# Patient Record
Sex: FEMALE | Race: WHITE | NOT HISPANIC OR LATINO | Employment: FULL TIME | URBAN - METROPOLITAN AREA
[De-identification: names, ages, dates, MRNs, and addresses within clinical notes are randomized per-mention and may not be internally consistent; named-entity substitution may affect disease eponyms.]

---

## 2023-04-27 ENCOUNTER — ULTRASOUND (OUTPATIENT)
Dept: OBGYN CLINIC | Facility: MEDICAL CENTER | Age: 38
End: 2023-04-27

## 2023-04-27 VITALS
HEIGHT: 67 IN | BODY MASS INDEX: 26.21 KG/M2 | SYSTOLIC BLOOD PRESSURE: 116 MMHG | WEIGHT: 167 LBS | DIASTOLIC BLOOD PRESSURE: 70 MMHG

## 2023-04-27 DIAGNOSIS — Z32.01 PREGNANCY CONFIRMED BY POSITIVE BLOOD TEST: Primary | ICD-10-CM

## 2023-05-12 ENCOUNTER — ULTRASOUND (OUTPATIENT)
Dept: OBGYN CLINIC | Facility: CLINIC | Age: 38
End: 2023-05-12

## 2023-05-12 VITALS
DIASTOLIC BLOOD PRESSURE: 76 MMHG | SYSTOLIC BLOOD PRESSURE: 120 MMHG | WEIGHT: 165 LBS | HEIGHT: 67 IN | BODY MASS INDEX: 25.9 KG/M2

## 2023-05-12 DIAGNOSIS — O26.841 UTERINE SIZE-DATE DISCREPANCY IN FIRST TRIMESTER: Primary | ICD-10-CM

## 2023-05-20 NOTE — PROGRESS NOTES
Assessment Khushi was seen today for pregnancy ultrasound  Diagnoses and all orders for this visit:    Uterine size-date discrepancy in first trimester  -     AMB US OB < 14 weeks single or first gestation level 1  -     Cancel: AMB US OB < 14 weeks single or first gestation level 1         Plan:  [address] today showing :   LIve IUP   FHT  174  EDC based on todays  US  23   Based on 6 weeks US  23    FINAL EDC 23    Recommend keeping scheduled appt for [facility] as interested in genetic testing as well as OB intake        Subjective   Herrera Garcia is a 45 y o  female here for a repeat dating US given discrepancy on dating derived from [address] and her LMP   LMP is  23 and she is sure of this as she has been attempting to conceive   Based on [address]  On 23 she was 6 weeks 5 days with ALEJANDRA  23   States she has been having nausea and feeling tired   No interested in prescriptions for management at this time  Also states has appt scheduled at [facility] as is interested in genetic testing   Her husbands sister has a child affected by rare mitochondrial disease     Patient Active Problem List   Diagnosis   • Abnormal uterine bleeding   • Anxiety   • Atypical bipolar affective disorder (HCC)   • BMI 27 0-27 9,adult   • Episodic mood disorder (HCC)   • Fatigue   • Inflammatory disease of cervix, vagina, and vulva   • Irregular menstrual cycle   • Menorrhagia   • Personality disorder [facility])       Gynecologic History  No LMP recorded  The current method of family planning is none      Past Medical History:   Diagnosis Date   • Anxiety    • Depression      Past Surgical History:   Procedure Laterality Date   •  SECTION       Family History   Problem Relation Age of Onset   • No Known Problems Mother    • No Known Problems Father    • No Known Problems Sister    • Bipolar disorder Brother    • No Known Problems Daughter      Social History     Socioeconomic History   • Marital status: /Civil Union Spouse name: Not on file   • Number of children: Not on file   • Years of education: Not on file   • Highest education level: Not on file   Occupational History   • Occupation: [employer]   Tobacco Use   • Smoking status: Never   • Smokeless tobacco: Never   Vaping Use   • Vaping Use: Never used   Substance and Sexual Activity   • Alcohol use: Not Currently     Comment: social   • Drug use: No   • Sexual activity: Yes     Partners: Male     Birth control/protection: None   Other Topics Concern   • Not on file   Social History Narrative    CONSUMES 2 CUPS OF COFFEE PER DY    Birth control not practiced    IUD in place     Social Determinants of Health     Financial Resource Strain: Not on file   Food Insecurity: Not on file   Transportation Needs: Not on file   Physical Activity: Not on file   Stress: Not on file   Social Connections: Not on file   Intimate Partner Violence: Not on file   Housing Stability: Not on file     No Known Allergies    Current Outpatient Medications:   •  escitalopram (LEXAPRO) 10 mg tablet, Take 10 mg by mouth, Disp: , Rfl:   •  ALPRAZolam (XANAX) 0 25 mg tablet, Take 0 25 mg by mouth every 24 hours (Patient not taking: Reported on 4/27/2023), Disp: , Rfl:   •  azelastine (ASTELIN) 0 1 % nasal spray, 1 spray into each nostril 2 (two) times a day, Disp: 1 Bottle, Rfl: 11  •  lidocaine (XYLOCAINE) 2 % topical gel, Apply 1 application topically 3 (three) times a day (Patient not taking: Reported on 8/23/2019), Disp: 5 mL, Rfl: 2    Review of Systems  Constitutional :no fever, feels well, no tiredness, no recent weight gain or loss  ENT: no ear ache, no loss of hearing, no nosebleeds or nasal discharge, no sore throat or hoarseness  Cardiovascular: no complaints of slow or fast heart beat, no chest pain, no palpitations, no leg claudication or lower extremity edema    Respiratory: no complaints of shortness of shortness of breath, no DAWSON  Breasts:no complaints of breast pain, breast lump, or "nipple discharge  Gastrointestinal: no complaints of abdominal pain, constipation, nausea, vomiting, or diarrhea or bloody stools  Genitourinary : no complaints of dysuria, incontinence, pelvic pain, no dysmenorrhea, vaginal discharge or abnormal vaginal bleeding and as noted in HPI  Musculoskeletal: no complaints of arthralgia, no myalgia, no joint swelling or stiffness, no limb pain or swelling  Integumentary: no complaints of skin rash or lesion, itching or dry skin  Neurological: no complaints of headache, no confusion, no numbness or tingling, no dizziness or fainting     Objective     /76   Ht 5' 7\" (1 702 m)   Wt 74 8 kg (165 lb)   BMI 25 84 kg/m²     General:   appears stated age, cooperative, alert normal mood and affect   Lungs: Unlabored breathing     Skin normal skin turgor and no rashes     Psychiatric orientation to person, place, and time: normal  mood and affect: normal     "

## 2023-05-23 ENCOUNTER — INITIAL PRENATAL (OUTPATIENT)
Dept: OBGYN CLINIC | Facility: MEDICAL CENTER | Age: 38
End: 2023-05-23

## 2023-05-23 VITALS
WEIGHT: 164 LBS | SYSTOLIC BLOOD PRESSURE: 92 MMHG | BODY MASS INDEX: 25.74 KG/M2 | HEIGHT: 67 IN | DIASTOLIC BLOOD PRESSURE: 68 MMHG

## 2023-05-23 DIAGNOSIS — Z34.81 PRENATAL CARE, SUBSEQUENT PREGNANCY, FIRST TRIMESTER: Primary | ICD-10-CM

## 2023-05-23 RX ORDER — AMOXICILLIN AND CLAVULANATE POTASSIUM 875; 125 MG/1; MG/1
TABLET, FILM COATED ORAL
COMMUNITY
Start: 2023-04-07

## 2023-05-23 RX ORDER — ALBUTEROL SULFATE 90 UG/1
2 AEROSOL, METERED RESPIRATORY (INHALATION) EVERY 6 HOURS PRN
COMMUNITY
Start: 2022-12-06

## 2023-05-23 RX ORDER — MELATONIN
2000 DAILY
COMMUNITY

## 2023-05-23 NOTE — PROGRESS NOTES
OB History    Para Term  AB Living   2 1 1     1   SAB IAB Ectopic Multiple Live Births           1      # Outcome Date GA Lbr Joe/2nd Weight Sex Delivery Anes PTL Lv   2 Current            1 Term  40w0d   F CS-LTranv   ZAIN      Complications: Failed Induction         * Pt presents for OB intake  *  *Pt's LMP was Patient's last menstrual period was 2023 (exact date)  *Ultrasound date:2023   10weeks 3days  *Estimated date of delivery: 2023   * confirmed by ultrasound    *Signs/Symptoms of Pregnancy   *yes Constipation    *no Headaches   *no Cramping  *no Spotting   *yes Nausea     Diabetes  If any of the following answers are  yes, please order 1 hour GTT, 50g   *no Hx of GDM    *no BMI >35    *no First degree relative with type 2 diabetes    *no Hx of PCOS   *no Current metformin use    *no Prior hx of LGA/macrosomia    *no AMA with other risk factors    Hypertension-    *no Hx of chronic HTN    *no Hx of gestational HTN   *no hx of preeclampsia, eclampsia, or HELLP syndrome     *Infection Screening   *no Does the pt have a hx of MRSA? *if yes- follow MRSA protocol and obtain a nasal swab for MRSA culture   *no History of herpes?      *Immunizations:   *yes Discussed influenza vaccine     Not flu season   *yes Discussed TDaP vaccine   *yes COVID Vaccine x2    SOCIOECONOMIC:  Mental/Physical/Sexual Abuse  *no  Housing Security  *no  Food Security  *no  Speacial Needs/Challenges  *no  Substance Use Disorder   ETOH   *no    OPIOD   *no     THC *no    Other: no  Opioid Therapy *no    ACTIVE MEDICAL/MENTAL HEALTH CONDITIONS  Depression *yes   Anxiety*yes  Medications*yes    *Interview education   *Handouts given:    *Baby and Me support center     *Isma sign up instructions    *Lab Locations    *  [place] Pediatricians List/Choosing Pediatrician Sheet    *[facility] Childbirth and Parenting Classes    *Schedule for Prenatal Visits    *Pregnancy Warning Signs Reviewed    *Safe Medications During Pregnancy    *SMA and CF Testing information sheet    *CPT Code Sheet/MFM 13week NT pamphlet    *[employer]      SBIRT Screen:  Depression Screening Follow-up Plan: Patient's depression screening was negative with an [place] score of 4          *[facility]   *yes discussed genetic testing- pt interested     Patient has been informed of basic prenatal advice such as avoiding alcohol, drugs, and smoking  She should remain hydrated and take daily prenatal vitamins  Patient should avoid caffeine, raw sprouts, high mercury fish, undercooked fish, raw eggs, organ meat, unwashed produce, and unpasteurized cheeses, milk, and fruit juice and undercooked meats  She has been informed about toxoplasmosis and to avoid cat feces  *Details that I feel the provider should be aware of:  Gwen Lozano presented today for initial ob intake at 10w3d  She has complaints of nausea and constipation - reviewed medications she can take  No other complaints at this time  MFM appointments made  Prenatal labs ordered  PN1 visit scheduled for *06/07/2023  The patient was oriented to our practice and all questions were answered      Interviewed by:  Pawel Dye

## 2023-06-06 PROBLEM — G44.209 TENSION TYPE HEADACHE: Status: ACTIVE | Noted: 2020-01-20

## 2023-06-06 NOTE — PROGRESS NOTES
Prenatal H&P     Denies loss of fluid, vaginal discharge, vaginal bleeding  and abdominal pain  Not feeling fetal movement  Tolerating PNV well  Has not gotten prenatal panel drawn yet  Plans for genetic screening appointment 23  Planned pregnancy    OB history:     G1- 2008 term  female 6lb 11oz; post term IOL failure to progress      G2- current      Dating:     LMP - 23 ALEJANDRA 12/3/23     US on 23 @  6w 5d ALEJANDRA 23  Working ALEJANDRA 23     Surgical history:      and wisdom teeth     Medical History:     Anxiety/ depression     Social history:     Alcohol/ tobacco/ illicit drug social alcohol use prior to pregnancy/denies drug or tobacco use     Denies history of STD/STI     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, family and friends supportive     PCP/ Dental last PE 2023 / last cleaning 3/2023     SBIRT screen negative at intake    She denies a hx of recent cough, chills, fever,  STD/STI, denies a personal history  of TB or  Zika virus or close contacts with persons with TB or COVID -19  FOB niece - mitochondrial disease  She denies any other family history of inheritable conditions such as physical or intellectual disabilities, birth defects, blood disorders, heart or neural tube defects  She has never had MRSA  She denies recent travel or travel planned in the near future  Patient Plans   - Feeding breast-feeding  - Contraception uncertain  - Aware office delivers at [employer]  If < 32 weeks will recommend evaluation at [address]:  - Continue prenatal vitamin daily  -  Genetic screening/ [facility] appointment 23  -Prior  section  Patient plans TOLAC  - History of depression/anxiety continue Lexapro as ordered 10 mg daily  Encouraged to call office with worsening symptoms, thoughts of self-harm or harm to others  -  Weight gain in pregnancy- Who BMI recommend 15-25 lb   Healthy diet and daily exercise reviewed and encouraged  - Unit regimen reviewed visit every 4 weeks until 28 weeks, every 2 weeks until 36 weeks and then weekly until delivery  - Gonorrhea/chlamydia collected  Pap smear collected  Encouraged to complete prenatal panel   - Vaccines in Pregnancy      - TDAP vaccine after 27 gestational weeks     - Reviewed with patient  Pregnancy with COVID infection is considered  high risk and can have poor outcome including  delivery, more severe infection  therefore  pregnant patients are recommended to get  COVID-19 vaccination  Written information provided  Had vaccine x  2     - influenza October- March NA   -  Common discomforts of pregnancy and precautions reviewed  Signs and symptoms to report reviewed  Encouraged social distancing, good hand hygiene, masking, avoiding crowds and written information provided about COVID-19    RTO 4 weeks

## 2023-06-07 ENCOUNTER — APPOINTMENT (OUTPATIENT)
Dept: LAB | Facility: MEDICAL CENTER | Age: 38
End: 2023-06-07
Payer: COMMERCIAL

## 2023-06-07 ENCOUNTER — INITIAL PRENATAL (OUTPATIENT)
Dept: OBGYN CLINIC | Facility: MEDICAL CENTER | Age: 38
End: 2023-06-07

## 2023-06-07 VITALS — DIASTOLIC BLOOD PRESSURE: 62 MMHG | WEIGHT: 166 LBS | BODY MASS INDEX: 26 KG/M2 | SYSTOLIC BLOOD PRESSURE: 110 MMHG

## 2023-06-07 DIAGNOSIS — Z12.4 SCREENING FOR CERVICAL CANCER: ICD-10-CM

## 2023-06-07 DIAGNOSIS — Z3A.12 12 WEEKS GESTATION OF PREGNANCY: ICD-10-CM

## 2023-06-07 DIAGNOSIS — Z34.81 PRENATAL CARE, SUBSEQUENT PREGNANCY, FIRST TRIMESTER: ICD-10-CM

## 2023-06-07 DIAGNOSIS — O09.522 MULTIGRAVIDA OF ADVANCED MATERNAL AGE IN SECOND TRIMESTER: Primary | ICD-10-CM

## 2023-06-07 DIAGNOSIS — Z11.3 ROUTINE SCREENING FOR STI (SEXUALLY TRANSMITTED INFECTION): ICD-10-CM

## 2023-06-07 LAB
ABO GROUP BLD: NORMAL
BACTERIA UR QL AUTO: ABNORMAL /HPF
BASOPHILS # BLD AUTO: 0.03 THOUSANDS/ÂΜL (ref 0–0.1)
BASOPHILS NFR BLD AUTO: 0 % (ref 0–1)
BILIRUB UR QL STRIP: NEGATIVE
BLD GP AB SCN SERPL QL: NEGATIVE
CAOX CRY URNS QL MICRO: ABNORMAL /HPF
CLARITY UR: CLEAR
COLOR UR: YELLOW
EOSINOPHIL # BLD AUTO: 0.22 THOUSAND/ÂΜL (ref 0–0.61)
EOSINOPHIL NFR BLD AUTO: 2 % (ref 0–6)
ERYTHROCYTE [DISTWIDTH] IN BLOOD BY AUTOMATED COUNT: 12.9 % (ref 11.6–15.1)
GLUCOSE UR STRIP-MCNC: NEGATIVE MG/DL
HCT VFR BLD AUTO: 38.1 % (ref 34.8–46.1)
HGB BLD-MCNC: 12.5 G/DL (ref 11.5–15.4)
HGB UR QL STRIP.AUTO: NEGATIVE
HIV 1+2 AB+HIV1 P24 AG SERPL QL IA: NORMAL
HIV 2 AB SERPL QL IA: NORMAL
HIV1 AB SERPL QL IA: NORMAL
HIV1 P24 AG SERPL QL IA: NORMAL
IMM GRANULOCYTES # BLD AUTO: 0.06 THOUSAND/UL (ref 0–0.2)
IMM GRANULOCYTES NFR BLD AUTO: 1 % (ref 0–2)
KETONES UR STRIP-MCNC: NEGATIVE MG/DL
LEUKOCYTE ESTERASE UR QL STRIP: NEGATIVE
LYMPHOCYTES # BLD AUTO: 2.32 THOUSANDS/ÂΜL (ref 0.6–4.47)
LYMPHOCYTES NFR BLD AUTO: 21 % (ref 14–44)
MCH RBC QN AUTO: 29.1 PG (ref 26.8–34.3)
MCHC RBC AUTO-ENTMCNC: 32.8 G/DL (ref 31.4–37.4)
MCV RBC AUTO: 89 FL (ref 82–98)
MONOCYTES # BLD AUTO: 0.69 THOUSAND/ÂΜL (ref 0.17–1.22)
MONOCYTES NFR BLD AUTO: 6 % (ref 4–12)
MUCOUS THREADS UR QL AUTO: ABNORMAL
NEUTROPHILS # BLD AUTO: 7.78 THOUSANDS/ÂΜL (ref 1.85–7.62)
NEUTS SEG NFR BLD AUTO: 70 % (ref 43–75)
NITRITE UR QL STRIP: NEGATIVE
NON-SQ EPI CELLS URNS QL MICRO: ABNORMAL /HPF
NRBC BLD AUTO-RTO: 0 /100 WBCS
PH UR STRIP.AUTO: 6 [PH]
PLATELET # BLD AUTO: 301 THOUSANDS/UL (ref 149–390)
PMV BLD AUTO: 11.3 FL (ref 8.9–12.7)
PROT UR STRIP-MCNC: ABNORMAL MG/DL
RBC # BLD AUTO: 4.3 MILLION/UL (ref 3.81–5.12)
RBC #/AREA URNS AUTO: ABNORMAL /HPF
RH BLD: POSITIVE
RUBV IGG SERPL IA-ACNC: 40.2 IU/ML
SP GR UR STRIP.AUTO: 1.02 (ref 1–1.03)
SPECIMEN EXPIRATION DATE: NORMAL
TREPONEMA PALLIDUM IGG+IGM AB [PRESENCE] IN SERUM OR PLASMA BY IMMUNOASSAY: NORMAL
UROBILINOGEN UR STRIP-ACNC: <2 MG/DL
WBC # BLD AUTO: 11.1 THOUSAND/UL (ref 4.31–10.16)
WBC #/AREA URNS AUTO: ABNORMAL /HPF

## 2023-06-07 PROCEDURE — 87591 N.GONORRHOEAE DNA AMP PROB: CPT | Performed by: NURSE PRACTITIONER

## 2023-06-07 PROCEDURE — 86850 RBC ANTIBODY SCREEN: CPT

## 2023-06-07 PROCEDURE — G0476 HPV COMBO ASSAY CA SCREEN: HCPCS | Performed by: NURSE PRACTITIONER

## 2023-06-07 PROCEDURE — 87389 HIV-1 AG W/HIV-1&-2 AB AG IA: CPT

## 2023-06-07 PROCEDURE — 86780 TREPONEMA PALLIDUM: CPT

## 2023-06-07 PROCEDURE — 86901 BLOOD TYPING SEROLOGIC RH(D): CPT

## 2023-06-07 PROCEDURE — 87491 CHLMYD TRACH DNA AMP PROBE: CPT | Performed by: NURSE PRACTITIONER

## 2023-06-07 PROCEDURE — 86706 HEP B SURFACE ANTIBODY: CPT

## 2023-06-07 PROCEDURE — 86762 RUBELLA ANTIBODY: CPT

## 2023-06-07 PROCEDURE — 87086 URINE CULTURE/COLONY COUNT: CPT

## 2023-06-07 PROCEDURE — 85025 COMPLETE CBC W/AUTO DIFF WBC: CPT

## 2023-06-07 PROCEDURE — G0145 SCR C/V CYTO,THINLAYER,RESCR: HCPCS | Performed by: NURSE PRACTITIONER

## 2023-06-07 PROCEDURE — PNV: Performed by: NURSE PRACTITIONER

## 2023-06-07 PROCEDURE — 87340 HEPATITIS B SURFACE AG IA: CPT

## 2023-06-07 PROCEDURE — 86900 BLOOD TYPING SEROLOGIC ABO: CPT

## 2023-06-07 PROCEDURE — 36415 COLL VENOUS BLD VENIPUNCTURE: CPT

## 2023-06-08 ENCOUNTER — ROUTINE PRENATAL (OUTPATIENT)
Age: 38
End: 2023-06-08
Payer: COMMERCIAL

## 2023-06-08 VITALS
HEIGHT: 67 IN | DIASTOLIC BLOOD PRESSURE: 58 MMHG | SYSTOLIC BLOOD PRESSURE: 102 MMHG | HEART RATE: 84 BPM | WEIGHT: 166.6 LBS | BODY MASS INDEX: 26.15 KG/M2

## 2023-06-08 DIAGNOSIS — Z36.82 ENCOUNTER FOR (NT) NUCHAL TRANSLUCENCY SCAN: Primary | ICD-10-CM

## 2023-06-08 DIAGNOSIS — Z3A.12 12 WEEKS GESTATION OF PREGNANCY: ICD-10-CM

## 2023-06-08 DIAGNOSIS — O09.521 MULTIGRAVIDA OF ADVANCED MATERNAL AGE IN FIRST TRIMESTER: ICD-10-CM

## 2023-06-08 DIAGNOSIS — O34.219 HISTORY OF CESAREAN DELIVERY, ANTEPARTUM: ICD-10-CM

## 2023-06-08 DIAGNOSIS — Z32.01 PREGNANCY CONFIRMED BY POSITIVE BLOOD TEST: ICD-10-CM

## 2023-06-08 LAB
BACTERIA UR CULT: NORMAL
C TRACH DNA SPEC QL NAA+PROBE: NEGATIVE
HBV SURFACE AB SER-ACNC: <3 MIU/ML
HBV SURFACE AG SER QL: NORMAL
N GONORRHOEA DNA SPEC QL NAA+PROBE: NEGATIVE

## 2023-06-08 PROCEDURE — 99203 OFFICE O/P NEW LOW 30 MIN: CPT | Performed by: STUDENT IN AN ORGANIZED HEALTH CARE EDUCATION/TRAINING PROGRAM

## 2023-06-08 PROCEDURE — 36415 COLL VENOUS BLD VENIPUNCTURE: CPT | Performed by: STUDENT IN AN ORGANIZED HEALTH CARE EDUCATION/TRAINING PROGRAM

## 2023-06-08 PROCEDURE — 76801 OB US < 14 WKS SINGLE FETUS: CPT | Performed by: STUDENT IN AN ORGANIZED HEALTH CARE EDUCATION/TRAINING PROGRAM

## 2023-06-08 PROCEDURE — 76813 OB US NUCHAL MEAS 1 GEST: CPT | Performed by: STUDENT IN AN ORGANIZED HEALTH CARE EDUCATION/TRAINING PROGRAM

## 2023-06-08 NOTE — PROGRESS NOTES
"[facility]: Ms Carmen De Anda was seen today for nuchal translucency ultrasound  See ultrasound report under \"OB Procedures\" tab  Physical Exam  Constitutional:       General: She is not in acute distress  Appearance: Normal appearance  HENT:      Head: Normocephalic and atraumatic  Eyes:      Extraocular Movements: Extraocular movements intact  Cardiovascular:      Rate and Rhythm: Normal rate  Pulmonary:      Effort: Pulmonary effort is normal  No respiratory distress  Skin:     Findings: No erythema or rash  Neurological:      Mental Status: She is alert and oriented to person, place, and time  Psychiatric:         Mood and Affect: Mood normal          Behavior: Behavior normal          Please don't hesitate to contact our office with any concerns or questions    -Bishop Mely MD      "

## 2023-06-08 NOTE — PROGRESS NOTES
"Patient chose to have [facility] Non-invasive Prenatal Screen with fetal sex (per pt request)  Patient given brochure and is aware [facility] will contact their insurance and coordinate coverage  Patient made aware she will need to respond to text message or e-mail from Domonique Chan within 2 business days or testing will be run through insurance  Patient informed text message will come from area code  \"415\"  Provided [employer] # [phone] and web site : [email or web address]  \"[place] your test online\" card with barcode and test tube ID provided to patient  Reviewed [facility]'s web site states 5-7 business days for results via their portal    [employer] message will be sent to patient when MFM receives results /provider reviews  2 vials of blood drawn from right arm by Collin Bradford RN  Patient tolerated blood draw without difficulty  Specimens labeled with patient identifiers (name, date of birth, specimen collection date), order and specimen were verified with patient, packed and sent via [employer]  FED EX  tracking #  [id number]  Copy of lab order scanned to Epic media  Maternal Fetal Medicine will have results in approximately 7-10 business days and will call patient or notify via [address]  Patient aware viewing lab result online will reveal fetal sex if ordered  Patient verbalized understanding of all instructions and no questions at this time      "

## 2023-06-09 LAB
HPV HR 12 DNA CVX QL NAA+PROBE: NEGATIVE
HPV16 DNA CVX QL NAA+PROBE: NEGATIVE
HPV18 DNA CVX QL NAA+PROBE: NEGATIVE

## 2023-06-13 LAB
LAB AP GYN PRIMARY INTERPRETATION: NORMAL
Lab: NORMAL

## 2023-07-05 ENCOUNTER — ROUTINE PRENATAL (OUTPATIENT)
Dept: OBGYN CLINIC | Facility: MEDICAL CENTER | Age: 38
End: 2023-07-05

## 2023-07-05 ENCOUNTER — APPOINTMENT (OUTPATIENT)
Dept: LAB | Facility: MEDICAL CENTER | Age: 38
End: 2023-07-05
Payer: COMMERCIAL

## 2023-07-05 VITALS — WEIGHT: 169 LBS | SYSTOLIC BLOOD PRESSURE: 114 MMHG | BODY MASS INDEX: 26.47 KG/M2 | DIASTOLIC BLOOD PRESSURE: 66 MMHG

## 2023-07-05 DIAGNOSIS — Z3A.16 16 WEEKS GESTATION OF PREGNANCY: Primary | ICD-10-CM

## 2023-07-05 DIAGNOSIS — Z3A.16 16 WEEKS GESTATION OF PREGNANCY: ICD-10-CM

## 2023-07-05 PROCEDURE — 82105 ALPHA-FETOPROTEIN SERUM: CPT

## 2023-07-05 PROCEDURE — PNV: Performed by: OBSTETRICS & GYNECOLOGY

## 2023-07-05 PROCEDURE — 36415 COLL VENOUS BLD VENIPUNCTURE: CPT

## 2023-07-05 NOTE — PROGRESS NOTES
Palma Patel is a 45y.o. year old  at 16w4d for routine prenatal visit.   + FM, no vaginal bleeding, contractions, or LOF  Complaints: Yes - dry stuffy nose  - supportive care reviewed    Most recent ultrasound and labs reviewed.   Order for MSAFP given   Reviewed her prenatal labs - aware Hep B non immune and that this vaccine can be given in pregnancy  Through pcp   Has level 2 scheduled

## 2023-07-08 LAB
2ND TRIMESTER 4 SCREEN SERPL-IMP: NORMAL
AFP ADJ MOM SERPL: 0.73
AFP INTERP AMN-IMP: NORMAL
AFP INTERP SERPL-IMP: NORMAL
AFP INTERP SERPL-IMP: NORMAL
AFP SERPL-MCNC: 30.6 NG/ML
AGE AT DELIVERY: 38.6 YR
GA METHOD: NORMAL
GA: 18.1 WEEKS
IDDM PATIENT QL: NO
MULTIPLE PREGNANCY: NO
NEURAL TUBE DEFECT RISK FETUS: NORMAL %

## 2023-07-19 ENCOUNTER — TELEPHONE (OUTPATIENT)
Facility: HOSPITAL | Age: 38
End: 2023-07-19

## 2023-07-19 NOTE — TELEPHONE ENCOUNTER
Left voicemail informing patient we unfortunately had to reschedule her 8/2 appointment to 8/8 at 8am in [facility]. Requested she give our office a call back at [phone] with any questions or if she would need to reschedule.

## 2023-08-01 PROBLEM — Z3A.20 20 WEEKS GESTATION OF PREGNANCY: Status: ACTIVE | Noted: 2023-06-07

## 2023-08-02 ENCOUNTER — ROUTINE PRENATAL (OUTPATIENT)
Dept: OBGYN CLINIC | Facility: MEDICAL CENTER | Age: 38
End: 2023-08-02

## 2023-08-02 VITALS
BODY MASS INDEX: 27.56 KG/M2 | SYSTOLIC BLOOD PRESSURE: 102 MMHG | DIASTOLIC BLOOD PRESSURE: 60 MMHG | HEIGHT: 67 IN | WEIGHT: 175.6 LBS

## 2023-08-02 DIAGNOSIS — Z3A.20 20 WEEKS GESTATION OF PREGNANCY: ICD-10-CM

## 2023-08-02 DIAGNOSIS — O09.522 MULTIGRAVIDA OF ADVANCED MATERNAL AGE IN SECOND TRIMESTER: Primary | ICD-10-CM

## 2023-08-02 PROCEDURE — PNV: Performed by: NURSE PRACTITIONER

## 2023-08-02 RX ORDER — ESCITALOPRAM OXALATE 10 MG/1
10 TABLET ORAL DAILY
COMMUNITY
Start: 2023-07-05

## 2023-08-02 NOTE — PROGRESS NOTES
Denies loss of fluid, vaginal bleeding and abdominal pain. Confirms fetal movement. Tolerating prenatal vitamin, low-dose aspirin probiotic, vitamin D and Lexapro well. Denies worsening symptoms, thoughts of self-harm or harm to others. Has not needed Xanax. Had a headache last week that lasted for about 3 days bilateral temples and back of head. Associated symptoms included nasal congestion. Headache was relieved with Tylenol and Claritin use. Has a history of allergies. Denies other concerns or questions at today's visit  BP: 102/60 weight: +12lbs  Plan   -continue prenatal vitamins and low-dose aspirin daily  -Anxiety in pregnancy continue Lexapro as ordered. Encouraged to call office with worsening symptoms, thoughts of harm to others or self-harm  -Follow-up with  center scheduled for 23  -Reviewed with patient options for antihistamine-Claritin, Allegra or Zyrtec without D. Can also take plain Sudafed. -Common discomforts of pregnancy and precautions reviewed.   Signs and symptoms to report reviewed  RTO 4 weeks f/u US

## 2023-08-08 ENCOUNTER — ROUTINE PRENATAL (OUTPATIENT)
Age: 38
End: 2023-08-08
Payer: COMMERCIAL

## 2023-08-08 VITALS
SYSTOLIC BLOOD PRESSURE: 100 MMHG | DIASTOLIC BLOOD PRESSURE: 70 MMHG | HEART RATE: 97 BPM | HEIGHT: 67 IN | BODY MASS INDEX: 27.88 KG/M2 | WEIGHT: 177.6 LBS

## 2023-08-08 DIAGNOSIS — Z36.86 ENCOUNTER FOR ANTENATAL SCREENING FOR CERVICAL LENGTH: ICD-10-CM

## 2023-08-08 DIAGNOSIS — Z3A.21 21 WEEKS GESTATION OF PREGNANCY: ICD-10-CM

## 2023-08-08 DIAGNOSIS — O35.EXX0 PYELECTASIS OF FETUS ON PRENATAL ULTRASOUND: Primary | ICD-10-CM

## 2023-08-08 PROCEDURE — 76817 TRANSVAGINAL US OBSTETRIC: CPT | Performed by: OBSTETRICS & GYNECOLOGY

## 2023-08-08 PROCEDURE — 76811 OB US DETAILED SNGL FETUS: CPT | Performed by: OBSTETRICS & GYNECOLOGY

## 2023-08-08 PROCEDURE — 99213 OFFICE O/P EST LOW 20 MIN: CPT | Performed by: OBSTETRICS & GYNECOLOGY

## 2023-08-08 NOTE — LETTER
2023     [address] [facility] [address], [address] [facility]  [place]    Patient: Leonie Sow   YOB: 1985   Date of Visit: 2023       Dear Dr. Hussein Nieves: Thank you for referring Leonie Sow to me for evaluation. Below are my notes for this consultation. If you have questions, please do not hesitate to call me. I look forward to following your patient along with you. Sincerely,        Mo Barajas MD        CC: No Recipients    oM Barajas MD  2023  1:29 PM  Sign when Signing Visit  Leonie Sow  has no complaints today at 21w3d. She reports fetal movements and does not report any vaginal bleeding or signs of labor. Her recently completed fetal testing revealed a normal NIPT and MSAFP. She is here today for a and anatomical survey of her fetus. Problem list:  1. Long interval pregnancy  2. Advanced maternal age  1. History of prior  is planning a   4. Maternal anxiety on Lexapro    Ultrasound findings: The ultrasound today shows normal interval fetal growth and fluid, normal cervical length, and no malformations were detected. The renal pelvises appear prominent and measure 4 mm bilaterally. Pregnancy ultrasound has limitations and is unable to detect all forms of fetal congenital abnormalities. Specific counseling was provided on the following problems:  1. 2 - 3% of babies have pyelectasis ( dilation of the renal pelvis) on US which has resulted from a blockage along the urinary tract. Renal pelvis size less then 4 mm in the second trimester and less then 7 mm after 32 weeks is a normal variant as long as there is no calyceal dilation, ureteromegaly, oligohydramnios, and the bladder and renal parenchyma appear normal. It is found more commonly in males. Follow up recommended:   1. Recommend a follow-up ultrasound for fetal growth and review of the fetal kidneys at 32 weeks.      Pre visit time reviewing her records   5 minutes  Face to face time 10 minutes  Post visit time on documentation of note, updating her problem list, adding orders and prescriptions 5 minutes. Procedures that were completed today were charged separately. The level of decision making was low level complexity.     Shlomo Lyles MD

## 2023-08-08 NOTE — PROGRESS NOTES
Ultrasound Probe Disinfection    A transvaginal ultrasound was performed. Prior to use, disinfection was performed with High Level Disinfection Process ([employer]). Probe serial number S2: [id number] was used.       Ariella Tran  08/08/23  7:55 AM

## 2023-08-08 NOTE — PROGRESS NOTES
Catrina Allred  has no complaints today at 21w3d. She reports fetal movements and does not report any vaginal bleeding or signs of labor. Her recently completed fetal testing revealed a normal NIPT and MSAFP. She is here today for a and anatomical survey of her fetus. Problem list:  1. Long interval pregnancy  2. Advanced maternal age  1. History of prior  is planning a   4. Maternal anxiety on Lexapro    Ultrasound findings: The ultrasound today shows normal interval fetal growth and fluid, normal cervical length, and no malformations were detected. The renal pelvises appear prominent and measure 4 mm bilaterally. Pregnancy ultrasound has limitations and is unable to detect all forms of fetal congenital abnormalities. Specific counseling was provided on the following problems:  1. 2 - 3% of babies have pyelectasis ( dilation of the renal pelvis) on US which has resulted from a blockage along the urinary tract. Renal pelvis size less then 4 mm in the second trimester and less then 7 mm after 32 weeks is a normal variant as long as there is no calyceal dilation, ureteromegaly, oligohydramnios, and the bladder and renal parenchyma appear normal. It is found more commonly in males. Follow up recommended:   1. Recommend a follow-up ultrasound for fetal growth and review of the fetal kidneys at 32 weeks. Pre visit time reviewing her records   5 minutes  Face to face time 10 minutes  Post visit time on documentation of note, updating her problem list, adding orders and prescriptions 5 minutes. Procedures that were completed today were charged separately. The level of decision making was low level complexity.     Moise Robledo MD

## 2023-08-29 ENCOUNTER — ROUTINE PRENATAL (OUTPATIENT)
Dept: OBGYN CLINIC | Facility: MEDICAL CENTER | Age: 38
End: 2023-08-29

## 2023-08-29 VITALS — WEIGHT: 181.9 LBS | DIASTOLIC BLOOD PRESSURE: 65 MMHG | BODY MASS INDEX: 28.49 KG/M2 | SYSTOLIC BLOOD PRESSURE: 110 MMHG

## 2023-08-29 DIAGNOSIS — O09.522 MULTIGRAVIDA OF ADVANCED MATERNAL AGE IN SECOND TRIMESTER: ICD-10-CM

## 2023-08-29 DIAGNOSIS — Z3A.28 28 WEEKS GESTATION OF PREGNANCY: ICD-10-CM

## 2023-08-29 DIAGNOSIS — Z34.82 ENCOUNTER FOR SUPERVISION OF OTHER NORMAL PREGNANCY IN SECOND TRIMESTER: Primary | ICD-10-CM

## 2023-08-29 PROCEDURE — PNV: Performed by: OBSTETRICS & GYNECOLOGY

## 2023-08-29 NOTE — PROGRESS NOTES
Routine Prenatal Visit  [facility] [address] [place] [address]    Assessment/Plan:  Marleni Bailey is a 45y.o. year old  at 20w1d who presents for routine prenatal visit. 1. Encounter for supervision of other normal pregnancy in second trimester    2. 28 weeks gestation of pregnancy  -     Anemia Panel w/Reflex, OB; Future  -     CBC and differential; Future  -     Glucose, 1H PG; Future  -     RPR-Syphilis Screening (Total Syphilis IGG/IGM); Future  -     Hepatitis C antibody; Future    3. Multigravida of advanced maternal age in second trimester          Subjective:     CC: Prenatal care    Ruddy Ohara is a 45 y.o.  female who presents for routine prenatal care at 20w1d. Pregnancy ROS: no leakage of fluid, pelvic pain, or vaginal bleeding.  + fetal movement. interested in tolac    The following portions of the patient's history were reviewed and updated as appropriate: allergies, current medications, past family history, past medical history, obstetric history, gynecologic history, past social history, past surgical history and problem list.      Objective:  /65   Wt 82.5 kg (181 lb 14.4 oz)   LMP 2023 (Exact Date)   BMI 28.49 kg/m²   Pregravid Weight/BMI: 73.9 kg (163 lb) (BMI 25.52)  Current Weight: 82.5 kg (181 lb 14.4 oz)   Total Weight Gain: 8.573 kg (18 lb 14.4 oz)   Pre- Vitals    Flowsheet Row Most Recent Value   Prenatal Assessment    Fetal Heart Rate 152   Movement Present   Prenatal Vitals    Blood Pressure 110/65   Weight - Scale 82.5 kg (181 lb 14.4 oz)   Urine Albumin/Glucose    Dilation/Effacement/Station    Vaginal Drainage    Edema    LLE Edema None   RLE Edema None   Facial Edema None           General: Well appearing, no distress  Respiratory: Unlabored breathing  Cardiovascular: Regular rate. Abdomen: Soft, gravid, nontender  Fundal Height: Appropriate for gestational age. Extremities: Warm and well perfused.   Non tender.

## 2023-09-26 ENCOUNTER — ROUTINE PRENATAL (OUTPATIENT)
Dept: OBGYN CLINIC | Facility: MEDICAL CENTER | Age: 38
End: 2023-09-26
Payer: COMMERCIAL

## 2023-09-26 ENCOUNTER — LAB (OUTPATIENT)
Dept: LAB | Facility: MEDICAL CENTER | Age: 38
End: 2023-09-26
Payer: COMMERCIAL

## 2023-09-26 VITALS — WEIGHT: 192.1 LBS | DIASTOLIC BLOOD PRESSURE: 60 MMHG | SYSTOLIC BLOOD PRESSURE: 102 MMHG | BODY MASS INDEX: 30.09 KG/M2

## 2023-09-26 DIAGNOSIS — Z23 NEED FOR TDAP VACCINATION: ICD-10-CM

## 2023-09-26 DIAGNOSIS — F41.9 ANXIETY: ICD-10-CM

## 2023-09-26 DIAGNOSIS — Z3A.28 28 WEEKS GESTATION OF PREGNANCY: ICD-10-CM

## 2023-09-26 DIAGNOSIS — O09.522 MULTIGRAVIDA OF ADVANCED MATERNAL AGE IN SECOND TRIMESTER: ICD-10-CM

## 2023-09-26 DIAGNOSIS — O34.219 PREVIOUS CESAREAN DELIVERY AFFECTING PREGNANCY: ICD-10-CM

## 2023-09-26 DIAGNOSIS — O99.891 BACK PAIN AFFECTING PREGNANCY IN THIRD TRIMESTER: ICD-10-CM

## 2023-09-26 DIAGNOSIS — M54.9 BACK PAIN AFFECTING PREGNANCY IN THIRD TRIMESTER: ICD-10-CM

## 2023-09-26 DIAGNOSIS — Z3A.28 28 WEEKS GESTATION OF PREGNANCY: Primary | ICD-10-CM

## 2023-09-26 LAB
BASOPHILS # BLD MANUAL: 0 THOUSAND/UL (ref 0–0.1)
BASOPHILS NFR MAR MANUAL: 0 % (ref 0–1)
EOSINOPHIL # BLD MANUAL: 0 THOUSAND/UL (ref 0–0.4)
EOSINOPHIL NFR BLD MANUAL: 0 % (ref 0–6)
ERYTHROCYTE [DISTWIDTH] IN BLOOD BY AUTOMATED COUNT: 13.6 % (ref 11.6–15.1)
GLUCOSE 1H P 50 G GLC PO SERPL-MCNC: 118 MG/DL (ref 40–134)
HCT VFR BLD AUTO: 34.1 % (ref 34.8–46.1)
HCV AB SER QL: NORMAL
HGB BLD-MCNC: 11.1 G/DL (ref 11.5–15.4)
LYMPHOCYTES # BLD AUTO: 2.76 THOUSAND/UL (ref 0.6–4.47)
LYMPHOCYTES # BLD AUTO: 8 % (ref 14–44)
MCH RBC QN AUTO: 30.4 PG (ref 26.8–34.3)
MCHC RBC AUTO-ENTMCNC: 32.6 G/DL (ref 31.4–37.4)
MCV RBC AUTO: 93 FL (ref 82–98)
MONOCYTES # BLD AUTO: 0.61 THOUSAND/UL (ref 0–1.22)
MONOCYTES NFR BLD: 4 % (ref 4–12)
MYELOCYTES NFR BLD MANUAL: 2 % (ref 0–1)
NEUTROPHILS # BLD MANUAL: 11.35 THOUSAND/UL (ref 1.85–7.62)
NEUTS BAND NFR BLD MANUAL: 3 % (ref 0–8)
NEUTS SEG NFR BLD AUTO: 71 % (ref 43–75)
PLATELET # BLD AUTO: 247 THOUSANDS/UL (ref 149–390)
PLATELET BLD QL SMEAR: ADEQUATE
PMV BLD AUTO: 11.6 FL (ref 8.9–12.7)
POLYCHROMASIA BLD QL SMEAR: PRESENT
PROMYELOCYTES NFR BLD MANUAL: 2 % (ref 0–0)
RBC # BLD AUTO: 3.65 MILLION/UL (ref 3.81–5.12)
RBC MORPH BLD: PRESENT
TREPONEMA PALLIDUM IGG+IGM AB [PRESENCE] IN SERUM OR PLASMA BY IMMUNOASSAY: NORMAL
VARIANT LYMPHS # BLD AUTO: 10 %
WBC # BLD AUTO: 15.34 THOUSAND/UL (ref 4.31–10.16)

## 2023-09-26 PROCEDURE — 90471 IMMUNIZATION ADMIN: CPT | Performed by: OBSTETRICS & GYNECOLOGY

## 2023-09-26 PROCEDURE — 86780 TREPONEMA PALLIDUM: CPT

## 2023-09-26 PROCEDURE — 82950 GLUCOSE TEST: CPT

## 2023-09-26 PROCEDURE — 36415 COLL VENOUS BLD VENIPUNCTURE: CPT

## 2023-09-26 PROCEDURE — 86803 HEPATITIS C AB TEST: CPT

## 2023-09-26 PROCEDURE — 90715 TDAP VACCINE 7 YRS/> IM: CPT | Performed by: OBSTETRICS & GYNECOLOGY

## 2023-09-26 PROCEDURE — 85027 COMPLETE CBC AUTOMATED: CPT

## 2023-09-26 PROCEDURE — PNV: Performed by: OBSTETRICS & GYNECOLOGY

## 2023-09-26 PROCEDURE — 85007 BL SMEAR W/DIFF WBC COUNT: CPT

## 2023-09-26 NOTE — PATIENT INSTRUCTIONS
Kick Counts in Pregnancy   WHAT YOU NEED TO KNOW:   Kick counts measure how much your baby is moving in your womb. A kick from your baby can be felt as a twist, turn, swish, roll, or jab. It is common to feel your baby kicking at 26 to 28 weeks of pregnancy. You may feel your baby kick as early as 20 weeks of pregnancy. You may want to start counting at 28 weeks. DISCHARGE INSTRUCTIONS:   Contact your doctor immediately if:   You feel a change in the number of kicks or movements of your baby. You feel fewer than 10 kicks within 2 hours. You have questions or concerns about your baby's movements. Why measure kick counts:  Your baby's movement may provide information about your baby's health. He or she may move less, or not at all, if there are problems. Your baby may move less if he or she is not getting enough oxygen or nutrition from the placenta. Do not smoke while you are pregnant. Smoking decreases the amount of oxygen that gets to your baby. Talk to your healthcare provider if you need help to quit smoking. Tell your healthcare provider as soon as you feel a change in your baby's movements. When to measure kick counts:   Measure kick counts at the same time every day. Measure kick counts when your baby is awake and most active. Your baby may be most active in the evening. How to measure kick counts:  Check that your baby is awake before you measure kick counts. You can wake up your baby by lightly pushing on your belly, walking, or drinking something cold. Your healthcare provider may tell you different ways to measure kick counts. You may be told to do the following:  Use a chart or clock to keep track of the time you start and finish counting. Sit in a chair or lie on your left side. Place your hands on the largest part of your belly. Count until you reach 10 kicks. Write down how much time it takes to count 10 kicks. It may take 30 minutes to 2 hours to count 10 kicks. It should not take more than 2 hours to count 10 kicks. Follow up with your doctor as directed:  Write down your questions so you remember to ask them during your visits. © Copyright Laura Sanchez 2023 Information is for End User's use only and may not be sold, redistributed or otherwise used for commercial purposes. The above information is an  only. It is not intended as medical advice for individual conditions or treatments. Talk to your doctor, nurse or pharmacist before following any medical regimen to see if it is safe and effective for you.

## 2023-09-26 NOTE — PROGRESS NOTES
Romy Worrell is a 45y.o. year old  at 28w3d for routine prenatal visit.   + FM, no vaginal bleeding, contractions, or LOF  Complaints: Yes - back pain at specific spot , we discussed stretching , tense unit , PT   Will go for 28 week labs today   [facility] reviewed   28 week packet discussed    TDAP given today   Rhogam not indicated   Most recent ultrasound and labs reviewed.   Previous C/S 15 years ago  - interested in [facility] / risks and benefits reviewed   Anxiety - doing well in lexapro

## 2023-09-29 NOTE — RESULT ENCOUNTER NOTE
Please inform patient normal 1 hr gtt - some abnormalities on CBC likely related to pregnancy  - recommend repeating CBC for next visit

## 2023-10-02 ENCOUNTER — CLINICAL SUPPORT (OUTPATIENT)
Dept: POSTPARTUM | Facility: CLINIC | Age: 38
End: 2023-10-02

## 2023-10-02 DIAGNOSIS — Z32.2 ENCOUNTER FOR CHILDBIRTH INSTRUCTION: Primary | ICD-10-CM

## 2023-10-10 ENCOUNTER — ROUTINE PRENATAL (OUTPATIENT)
Dept: OBGYN CLINIC | Facility: MEDICAL CENTER | Age: 38
End: 2023-10-10

## 2023-10-10 VITALS — DIASTOLIC BLOOD PRESSURE: 72 MMHG | SYSTOLIC BLOOD PRESSURE: 108 MMHG | BODY MASS INDEX: 30.62 KG/M2 | WEIGHT: 195.5 LBS

## 2023-10-10 DIAGNOSIS — Z3A.30 30 WEEKS GESTATION OF PREGNANCY: Primary | ICD-10-CM

## 2023-10-10 PROCEDURE — PNV: Performed by: OBSTETRICS & GYNECOLOGY

## 2023-10-10 NOTE — PROGRESS NOTES
Fabian Toro is a 45y.o. year old  at 30w3d for routine prenatal visit.   + FM, no vaginal bleeding, contractions, or LOF  Complaints: No   Most recent ultrasound and labs reviewed.   Has follow up scan end    [facility] reviewed   States will get flu shot next visit   Birth plan reviewed and signed

## 2023-10-14 ENCOUNTER — CLINICAL SUPPORT (OUTPATIENT)
Age: 38
End: 2023-10-14

## 2023-10-14 DIAGNOSIS — Z32.2 ENCOUNTER FOR CHILDBIRTH INSTRUCTION: Primary | ICD-10-CM

## 2023-10-24 ENCOUNTER — ROUTINE PRENATAL (OUTPATIENT)
Dept: OBGYN CLINIC | Facility: MEDICAL CENTER | Age: 38
End: 2023-10-24

## 2023-10-24 ENCOUNTER — LAB (OUTPATIENT)
Dept: LAB | Facility: MEDICAL CENTER | Age: 38
End: 2023-10-24
Payer: COMMERCIAL

## 2023-10-24 VITALS — BODY MASS INDEX: 31.01 KG/M2 | SYSTOLIC BLOOD PRESSURE: 118 MMHG | WEIGHT: 198 LBS | DIASTOLIC BLOOD PRESSURE: 76 MMHG

## 2023-10-24 DIAGNOSIS — Z3A.32 32 WEEKS GESTATION OF PREGNANCY: Primary | ICD-10-CM

## 2023-10-24 DIAGNOSIS — Z3A.30 30 WEEKS GESTATION OF PREGNANCY: ICD-10-CM

## 2023-10-24 PROCEDURE — PNV: Performed by: OBSTETRICS & GYNECOLOGY

## 2023-10-24 NOTE — PROGRESS NOTES
Jarod Santiago is a 45y.o. year old  at [address] [place] [address] for routine prenatal visit.   + FM, no vaginal bleeding, contractions, or LOF  Complaints: No   Most recent ultrasound and labs reviewed.   States will get flu shot next visit   Has US later this week for follow up growth and fetal presentation (breech last US)  We discussed order for repeating CBC  given  atypical lymphocytes - Order placed    [facility] reviewed

## 2023-10-26 ENCOUNTER — APPOINTMENT (OUTPATIENT)
Dept: LAB | Facility: MEDICAL CENTER | Age: 38
End: 2023-10-26
Payer: COMMERCIAL

## 2023-10-26 ENCOUNTER — ROUTINE PRENATAL (OUTPATIENT)
Age: 38
End: 2023-10-26
Payer: COMMERCIAL

## 2023-10-26 VITALS
WEIGHT: 200.2 LBS | HEART RATE: 73 BPM | DIASTOLIC BLOOD PRESSURE: 58 MMHG | HEIGHT: 67 IN | SYSTOLIC BLOOD PRESSURE: 90 MMHG | BODY MASS INDEX: 31.42 KG/M2

## 2023-10-26 DIAGNOSIS — O34.219 PREVIOUS CESAREAN DELIVERY AFFECTING PREGNANCY: ICD-10-CM

## 2023-10-26 DIAGNOSIS — Z36.89 ENCOUNTER FOR ULTRASOUND TO ASSESS FETAL GROWTH: ICD-10-CM

## 2023-10-26 DIAGNOSIS — Z3A.32 32 WEEKS GESTATION OF PREGNANCY: Primary | ICD-10-CM

## 2023-10-26 DIAGNOSIS — O35.EXX0 PYELECTASIS OF FETUS ON PRENATAL ULTRASOUND: ICD-10-CM

## 2023-10-26 DIAGNOSIS — O09.523 MULTIGRAVIDA OF ADVANCED MATERNAL AGE IN THIRD TRIMESTER: ICD-10-CM

## 2023-10-26 LAB
BASOPHILS # BLD MANUAL: 0 THOUSAND/UL (ref 0–0.1)
BASOPHILS NFR MAR MANUAL: 0 % (ref 0–1)
EOSINOPHIL # BLD MANUAL: 0.53 THOUSAND/UL (ref 0–0.4)
EOSINOPHIL NFR BLD MANUAL: 3 % (ref 0–6)
ERYTHROCYTE [DISTWIDTH] IN BLOOD BY AUTOMATED COUNT: 13.8 % (ref 11.6–15.1)
HCT VFR BLD AUTO: 34.6 % (ref 34.8–46.1)
HGB BLD-MCNC: 11.4 G/DL (ref 11.5–15.4)
LYMPHOCYTES # BLD AUTO: 11 % (ref 14–44)
LYMPHOCYTES # BLD AUTO: 2.64 THOUSAND/UL (ref 0.6–4.47)
MCH RBC QN AUTO: 30.2 PG (ref 26.8–34.3)
MCHC RBC AUTO-ENTMCNC: 32.9 G/DL (ref 31.4–37.4)
MCV RBC AUTO: 92 FL (ref 82–98)
METAMYELOCYTES NFR BLD MANUAL: 1 % (ref 0–1)
MONOCYTES # BLD AUTO: 0.53 THOUSAND/UL (ref 0–1.22)
MONOCYTES NFR BLD: 3 % (ref 4–12)
MYELOCYTES NFR BLD MANUAL: 1 % (ref 0–1)
NEUTROPHILS # BLD MANUAL: 13.53 THOUSAND/UL (ref 1.85–7.62)
NEUTS SEG NFR BLD AUTO: 77 % (ref 43–75)
PLATELET # BLD AUTO: 249 THOUSANDS/UL (ref 149–390)
PLATELET BLD QL SMEAR: ADEQUATE
PMV BLD AUTO: 11.6 FL (ref 8.9–12.7)
RBC # BLD AUTO: 3.78 MILLION/UL (ref 3.81–5.12)
RBC MORPH BLD: NORMAL
VARIANT LYMPHS # BLD AUTO: 4 %
WBC # BLD AUTO: 17.57 THOUSAND/UL (ref 4.31–10.16)

## 2023-10-26 PROCEDURE — 85007 BL SMEAR W/DIFF WBC COUNT: CPT

## 2023-10-26 PROCEDURE — 76816 OB US FOLLOW-UP PER FETUS: CPT | Performed by: OBSTETRICS & GYNECOLOGY

## 2023-10-26 PROCEDURE — 36415 COLL VENOUS BLD VENIPUNCTURE: CPT

## 2023-10-26 PROCEDURE — 85027 COMPLETE CBC AUTOMATED: CPT

## 2023-10-26 NOTE — LETTER
October 26, 2023     Debora Us MD  [address] [facility]    Patient: Eitan Carrion   YOB: 1985   Date of Visit: 10/26/2023       Dear Dr. Leobardo Kulkarni: Thank you for referring Eitan Carrion to me for evaluation. Below are my notes for this consultation. If you have questions, please do not hesitate to call me. I look forward to following your patient along with you. Sincerely,        Monica Fitch MD        CC: No Recipients    Monica Fitch MD  10/26/2023  8:32 AM  Sign when Signing Visit  [address] [place] [address] Dr: Eitan Carrion was seen today at 32w5d for fetal growth assessment ultrasound. See ultrasound report under "OB Procedures" tab.   Please don't hesitate to contact our office with any concerns or questions.  -Monica Fitch MD

## 2023-10-26 NOTE — PROGRESS NOTES
[address] [place] [address] Dr: Pratibha Leonard was seen today at 32w5d for fetal growth assessment ultrasound. See ultrasound report under "OB Procedures" tab.   Please don't hesitate to contact our office with any concerns or questions.  -Jackie Irving MD

## 2023-11-01 NOTE — RESULT ENCOUNTER NOTE
Please inform patient differential in CBC still having some slight abnormalities that I believe are mostly associated with pregnancy  - recommend repeating CBC after delivery

## 2023-11-03 PROBLEM — Z3A.34 34 WEEKS GESTATION OF PREGNANCY: Status: ACTIVE | Noted: 2023-06-07

## 2023-11-04 NOTE — PROGRESS NOTES
Problem List Items Addressed This Visit          Other    34 weeks gestation of pregnancy - Primary     Last scan was normal   Growth was 36%         Relevant Orders    FLUZONE: influenza vaccine, quadrivalent, 0.5 mL (Completed)    Multigravida of advanced maternal age in third trimester     Nipt / afp - normal          Previous  delivery affecting pregnancy     Mode of delivery   Considering TOLAC

## 2023-11-06 ENCOUNTER — ROUTINE PRENATAL (OUTPATIENT)
Dept: OBGYN CLINIC | Facility: MEDICAL CENTER | Age: 38
End: 2023-11-06
Payer: COMMERCIAL

## 2023-11-06 VITALS — SYSTOLIC BLOOD PRESSURE: 120 MMHG | BODY MASS INDEX: 31.58 KG/M2 | DIASTOLIC BLOOD PRESSURE: 60 MMHG | WEIGHT: 201.6 LBS

## 2023-11-06 DIAGNOSIS — O09.523 MULTIGRAVIDA OF ADVANCED MATERNAL AGE IN THIRD TRIMESTER: ICD-10-CM

## 2023-11-06 DIAGNOSIS — Z3A.34 34 WEEKS GESTATION OF PREGNANCY: Primary | ICD-10-CM

## 2023-11-06 DIAGNOSIS — O34.219 PREVIOUS CESAREAN DELIVERY AFFECTING PREGNANCY: ICD-10-CM

## 2023-11-06 PROCEDURE — 90471 IMMUNIZATION ADMIN: CPT

## 2023-11-06 PROCEDURE — PNV: Performed by: OBSTETRICS & GYNECOLOGY

## 2023-11-06 PROCEDURE — 90686 IIV4 VACC NO PRSV 0.5 ML IM: CPT

## 2023-11-21 ENCOUNTER — ROUTINE PRENATAL (OUTPATIENT)
Dept: OBGYN CLINIC | Facility: MEDICAL CENTER | Age: 38
End: 2023-11-21

## 2023-11-21 VITALS — DIASTOLIC BLOOD PRESSURE: 76 MMHG | BODY MASS INDEX: 31.7 KG/M2 | WEIGHT: 202.4 LBS | SYSTOLIC BLOOD PRESSURE: 110 MMHG

## 2023-11-21 DIAGNOSIS — O09.523 MULTIGRAVIDA OF ADVANCED MATERNAL AGE IN THIRD TRIMESTER: ICD-10-CM

## 2023-11-21 DIAGNOSIS — O34.219 PREVIOUS CESAREAN DELIVERY AFFECTING PREGNANCY: ICD-10-CM

## 2023-11-21 DIAGNOSIS — Z3A.36 36 WEEKS GESTATION OF PREGNANCY: ICD-10-CM

## 2023-11-21 DIAGNOSIS — Z34.93 THIRD TRIMESTER PREGNANCY: Primary | ICD-10-CM

## 2023-11-21 PROCEDURE — PNV: Performed by: OBSTETRICS & GYNECOLOGY

## 2023-11-21 PROCEDURE — 87150 DNA/RNA AMPLIFIED PROBE: CPT | Performed by: OBSTETRICS & GYNECOLOGY

## 2023-11-21 NOTE — PROGRESS NOTES
Assessment  45 y.o.  at 36w3d presenting for routine prenatal visit. Plan  Diagnoses and all orders for this visit:    Third trimester pregnancy  36 weeks gestation of pregnancy  -     PTL precautions  - [facility]KC  - Strep B DNA probe, amplification; Future  - Return in 1wk for PN    Multigravida of advanced maternal age in third trimester  - Follows with MFM  - Normal genetics    Previous  delivery affecting pregnancy  - Motivated for TOLAC    ____________________________________________________________        Subjective    Margaret Romero is a 45 y.o.  at 36w3d who presents for routine prenatal visit. She is without complaint. She denies contractions, loss of fluid, or vaginal bleeding. She feels regular fetal movements. Pregnancy Problems:  Patient Active Problem List   Diagnosis    Anxiety    Fatigue    Tension type headache    36 weeks gestation of pregnancy    Multigravida of advanced maternal age in third trimester    Previous  delivery affecting pregnancy    Back pain affecting pregnancy in third trimester         Objective  /76   Wt 91.8 kg (202 lb 6.4 oz)   LMP 2023 (Exact Date)   BMI 31.70 kg/m²     FHT: 133 BPM   Uterine Size: 35 cm   Presentations: cephalic   Pelvic Exam:     Dilation: Closed    Effacement: 50%    Station:  -3    Consistency: medium    Position: posterior     Physical Exam:  Physical Exam  Constitutional:       General: She is not in acute distress. Appearance: Normal appearance. She is well-developed. She is not ill-appearing, toxic-appearing or diaphoretic. HENT:      Head: Normocephalic and atraumatic. Eyes:      General: No scleral icterus. Right eye: No discharge. Left eye: No discharge. Conjunctiva/sclera: Conjunctivae normal.   Pulmonary:      Effort: Pulmonary effort is normal. No accessory muscle usage or respiratory distress. Abdominal:      General: There is distension (gravid). Tenderness:  There is no abdominal tenderness. There is no guarding or rebound. Skin:     General: Skin is warm and dry. Coloration: Skin is not jaundiced. Findings: No bruising, erythema or rash. Neurological:      Mental Status: She is alert. Psychiatric:         Mood and Affect: Mood normal.         Behavior: Behavior normal.         Thought Content:  Thought content normal.         Judgment: Judgment normal.

## 2023-11-22 LAB — GP B STREP DNA SPEC QL NAA+PROBE: NEGATIVE

## 2023-11-27 ENCOUNTER — CLINICAL SUPPORT (OUTPATIENT)
Age: 38
End: 2023-11-27

## 2023-11-27 DIAGNOSIS — Z32.2 ENCOUNTER FOR CHILDBIRTH INSTRUCTION: Primary | ICD-10-CM

## 2023-11-28 ENCOUNTER — ROUTINE PRENATAL (OUTPATIENT)
Dept: OBGYN CLINIC | Facility: MEDICAL CENTER | Age: 38
End: 2023-11-28

## 2023-11-28 VITALS — BODY MASS INDEX: 32.36 KG/M2 | WEIGHT: 206.6 LBS | DIASTOLIC BLOOD PRESSURE: 68 MMHG | SYSTOLIC BLOOD PRESSURE: 108 MMHG

## 2023-11-28 DIAGNOSIS — O09.523 MULTIGRAVIDA OF ADVANCED MATERNAL AGE IN THIRD TRIMESTER: Primary | ICD-10-CM

## 2023-11-28 DIAGNOSIS — O34.219 PREVIOUS CESAREAN DELIVERY AFFECTING PREGNANCY: ICD-10-CM

## 2023-11-28 PROBLEM — Z3A.37 37 WEEKS GESTATION OF PREGNANCY: Status: ACTIVE | Noted: 2023-06-07

## 2023-11-28 PROCEDURE — PNV: Performed by: OBSTETRICS & GYNECOLOGY

## 2023-11-28 NOTE — PATIENT INSTRUCTIONS
Fetal Movement   WHAT YOU NEED TO KNOW:   Fetal movements are the kicks, rolls, and hiccups of your unborn baby. You may start to feel these movements when you are 20 weeks pregnant. The movements grow stronger and more frequent as your baby grows. Fetal movements show that your unborn baby is getting the oxygen and nutrients he or she needs before birth. Fewer fetal movements may signal a problem with your baby's health. DISCHARGE INSTRUCTIONS:   Follow up with your doctor or obstetrician as directed:  Write down your questions so you remember to ask them during your visits. Normal fetal movement:  Fetal activity can be described by 4 states, from least to most active. During quiet sleep, your unborn baby may be still for up to 2 hours. During active sleep, he or she kicks, rolls, and moves often. During the quiet awake state, he or she may only move his or her eyes. The active awake state includes strong kicks and rolls. What affects fetal movement:  You may feel your baby move more after you eat, or after you drink caffeine. You may feel your baby move less while you are more active, such as when you exercise. You may also feel fewer movements if you are obese. Certain medicines can change your baby's movements. Tell your healthcare provider about the medicines you are taking. Track fetal movements at home:  Fetal movement is most often felt when you lie quietly on your side. Your healthcare provider may ask you to count movements for 2 hours. He or she may ask you to track how long it takes for your baby to move 10 times. Keep a log of your baby's movements. Contact your doctor or obstetrician if:   It takes longer than usual to feel 8 of your unborn baby's movements. You do not feel your unborn baby move at least 10 times in 2 hours. The skin on your hands, feet, and around your eyes is more swollen than usual.    You have a headache for at least 24 hours.     Tiny red dots appear on your skin.    Your belly is tender when you press on it. You have questions or concerns about your condition or care. Return to the emergency department if:   You do not feel your unborn baby move for 12 hours. You feel cramping or constant pain in your abdomen. You have heavy bleeding from your vagina. You have a severe headache and cannot see clearly. You are having trouble breathing or are vomiting. You have a seizure. © Copyright [employer] 2023 Information is for End User's use only and may not be sold, redistributed or otherwise used for commercial purposes. The above information is an  only. It is not intended as medical advice for individual conditions or treatments. Talk to your doctor, nurse or pharmacist before following any medical regimen to see if it is safe and effective for you. Early Labor Signs   WHAT YOU NEED TO KNOW:   Early labor signs can happen weeks, days, or hours before your baby is ready to be delivered. You may have false labor signs, which are also called Barren Barnhart contractions. False labor is common and may happen several weeks or days before your actual labor. The contractions are not regular, and do not get closer together. The pain is usually mild, does not worsen, and is felt only in front. Barren Barnhart contractions may happen later in the day, and stop after you change position, walk, or rest.  DISCHARGE INSTRUCTIONS:   Call 911 for any of the following: You have heavy vaginal bleeding. You cannot get to the hospital before the baby starts to come out. Return to the emergency department if:   You have regular, painful contractions that are less than 5 minutes apart and last 30 to 70 seconds each. You have a constant trickle or sudden gush of clear fluid from your vagina. You notice a sudden decrease in your baby's movement.     Contact your obstetrician or healthcare provider if:   You have pain in your lower back or abdomen that does not get better when you change positions. You have bloody mucus or show. You have questions or concerns about your condition or care. Early Labor signs and symptoms:   Lightening  occurs when your baby drops inside your pelvis. You may feel increased pressure in your pelvis. This may happen a few weeks to a few hours before your labor begins. Contractions  are cramps and tightening that occur in your uterus to help move the baby through your birth canal. Contractions occur regularly and more often each time. Each one lasts about 30 to 70 seconds, and gets stronger until you deliver your baby. Contractions do not go away with movement. The pain usually starts in your lower back and moves to your abdomen. Effacement  occurs when your cervix softens and thins, so it can easily open for the baby. You will not be able to feel effacement. Your healthcare provider will examine your cervix for effacement. Dilation  is widening of your cervix. Your healthcare provider will examine your cervix for dilation. Your cervix may start to dilate weeks before your baby is delivered. Your cervix will be fully opened and ready for delivery when it is dilated to 10 centimeters. Increased discharge  from your vagina may occur. It may be brown, pink, clear, or slightly bloody. This discharge may also be called bloody show. Bloody show is a mucus plug that forms and blocks your cervix during pregnancy. The discharge may mean that your cervix is opening up and getting ready for delivery. Rupture of membranes  is a sudden release of clear fluid from your vagina. Ruptured membranes means your water broke. Your healthcare provider may need to break your water if it does not happen on its own. © Copyright Janice Garsia 2023 Information is for End User's use only and may not be sold, redistributed or otherwise used for commercial purposes. The above information is an  only.  It is not intended as medical advice for individual conditions or treatments. Talk to your doctor, nurse or pharmacist before following any medical regimen to see if it is safe and effective for you.

## 2023-11-28 NOTE — PROGRESS NOTES
Routine Prenatal Visit  [facility] [address] [place] [address]    Assessment/Plan:  Rona Jain is a 45y.o. year old  at 37w3d who presents for routine prenatal visit. 3. Multigravida of advanced maternal age in third trimester    2. Previous  delivery affecting pregnancy          Subjective:     CC: Prenatal care    Arnaud Rick is a 45 y.o.  female who presents for routine prenatal care at 37w3d. Pregnancy ROS: no leakage of fluid, pelvic pain, or vaginal bleeding.  good fetal movement. Motivated for [facility]    The following portions of the patient's history were reviewed and updated as appropriate: allergies, current medications, past family history, past medical history, obstetric history, gynecologic history, past social history, past surgical history and problem list.      Objective:  /68   Wt 93.7 kg (206 lb 9.6 oz)   LMP 2023 (Exact Date)   BMI 32.36 kg/m²   Pregravid Weight/BMI: 73.9 kg (163 lb) (BMI 25.52)  Current Weight: 93.7 kg (206 lb 9.6 oz)   Total Weight Gain: 19.8 kg (43 lb 9.6 oz)   Pre- Vitals      Flowsheet Row Most Recent Value   Prenatal Assessment    Fetal Heart Rate 132   Movement Present   Prenatal Vitals    Blood Pressure 108/68   Weight - Scale 93.7 kg (206 lb 9.6 oz)   Urine Albumin/Glucose    Dilation/Effacement/Station    Vaginal Drainage    Draining Fluid No   Edema    LLE Edema Trace   RLE Edema Trace   Facial Edema Trace             General: Well appearing, no distress  Respiratory: Unlabored breathing  Cardiovascular: Regular rate. Abdomen: Soft, gravid, nontender  Fundal Height: Appropriate for gestational age. Extremities: Warm and well perfused. Non tender.

## 2023-12-07 ENCOUNTER — ROUTINE PRENATAL (OUTPATIENT)
Dept: OBGYN CLINIC | Facility: MEDICAL CENTER | Age: 38
End: 2023-12-07

## 2023-12-07 VITALS — SYSTOLIC BLOOD PRESSURE: 110 MMHG | BODY MASS INDEX: 32.42 KG/M2 | DIASTOLIC BLOOD PRESSURE: 64 MMHG | WEIGHT: 207 LBS

## 2023-12-07 DIAGNOSIS — O34.219 PREVIOUS CESAREAN DELIVERY AFFECTING PREGNANCY: Primary | ICD-10-CM

## 2023-12-07 DIAGNOSIS — Z3A.38 38 WEEKS GESTATION OF PREGNANCY: ICD-10-CM

## 2023-12-07 DIAGNOSIS — O09.523 MULTIGRAVIDA OF ADVANCED MATERNAL AGE IN THIRD TRIMESTER: ICD-10-CM

## 2023-12-07 PROCEDURE — PNV: Performed by: STUDENT IN AN ORGANIZED HEALTH CARE EDUCATION/TRAINING PROGRAM

## 2023-12-07 NOTE — PROGRESS NOTES
Routine Prenatal Visit  [facility] [address] [place] [address]    Assessment/Plan:  Pramod Nicholas is a 45y.o. year old  at 40w9d who presents for routine prenatal visit. 1. Previous  delivery affecting pregnancy  Assessment & Plan:  - We discussed the risks and benefits of trial of labor after  (TOLAC) versus planned repeat  delivery. - The patient does not have any contraindications to TOLAC, (e.g. no prior uterine rupture, myomectomy, transfundal surgery, classical hysterotomy, or other contraindcation to vaginal delivery). - We discussed the risks of TOLAC including uterine rupture (1 in 200) and the risk for intrapartum  section which carries a high risk of complications including hemorrhage, infection, surgical injury and hysterectomy. We discussed the risk of planned repeat  delivery in the context of her plans for future fertility. We discussed the added risk of surgical injury, adhesions, and abnormal placentation with each subsequent  delivery. - At this time, the patient feels well counseled on the risks and benefits of each option and opts to proceed with . - SVE closed firm an dposterior. We discussed that with an  unfavorable cervix, labor induction can be difficult after  due to inability to use prostaglandins. 2. Multigravida of advanced maternal age in third trimester    3. 45 weeks gestation of pregnancy          Subjective:     CC: Prenatal care    Fatemeh Bertrand is a 45 y.o. Rl Callejas female who presents for routine prenatal care at 38w5d. Pregnancy ROS: Denies leakage of fluid, pelvic pain, or vaginal bleeding. Reports normal fetal movement.     The following portions of the patient's history were reviewed and updated as appropriate: allergies, current medications, past family history, past medical history, obstetric history, gynecologic history, past social history, past surgical history and problem list.      Objective:  /64   Wt 93.9 kg (207 lb)   LMP 2023 (Exact Date)   BMI 32.42 kg/m²   Pregravid Weight/BMI: 73.9 kg (163 lb) (BMI 25.52)  Current Weight: 93.9 kg (207 lb)   Total Weight Gain: 20 kg (44 lb)   Pre- Vitals      Flowsheet Row Most Recent Value   Prenatal Assessment    Fetal Heart Rate 136   Movement Present   Prenatal Vitals    Blood Pressure 110/64   Weight - Scale 93.9 kg (207 lb)   Urine Albumin/Glucose    Dilation/Effacement/Station    Vaginal Drainage    Draining Fluid No   Edema    LLE Edema None   RLE Edema None             General: Well appearing, no distress  Respiratory: Unlabored breathing  Cardiovascular: Regular rate. Abdomen: Soft, gravid, nontender  Fundal Height: Appropriate for gestational age. Extremities: Warm and well perfused. Non tender.

## 2023-12-07 NOTE — ASSESSMENT & PLAN NOTE
- We discussed the risks and benefits of trial of labor after  (TOLAC) versus planned repeat  delivery. - The patient does not have any contraindications to TOLAC, (e.g. no prior uterine rupture, myomectomy, transfundal surgery, classical hysterotomy, or other contraindcation to vaginal delivery). - We discussed the risks of TOLAC including uterine rupture (1 in 200) and the risk for intrapartum  section which carries a high risk of complications including hemorrhage, infection, surgical injury and hysterectomy. We discussed the risk of planned repeat  delivery in the context of her plans for future fertility. We discussed the added risk of surgical injury, adhesions, and abnormal placentation with each subsequent  delivery. - At this time, the patient feels well counseled on the risks and benefits of each option and opts to proceed with . - SVE closed firm and posterior.  We discussed that with an unfavorable cervix, labor induction can be difficult after  due to inability to use prostaglandins.    - F

## 2023-12-08 ENCOUNTER — TELEPHONE (OUTPATIENT)
Dept: OBGYN CLINIC | Facility: CLINIC | Age: 38
End: 2023-12-08

## 2023-12-08 ENCOUNTER — TELEPHONE (OUTPATIENT)
Dept: OBGYN CLINIC | Facility: MEDICAL CENTER | Age: 38
End: 2023-12-08

## 2023-12-08 NOTE — TELEPHONE ENCOUNTER
Pt called office today she changed her mind she doesn't want to do IOL she wants to talk about having a .

## 2023-12-12 ENCOUNTER — ROUTINE PRENATAL (OUTPATIENT)
Dept: OBGYN CLINIC | Facility: MEDICAL CENTER | Age: 38
End: 2023-12-12

## 2023-12-12 ENCOUNTER — TELEPHONE (OUTPATIENT)
Dept: OBGYN CLINIC | Facility: MEDICAL CENTER | Age: 38
End: 2023-12-12

## 2023-12-12 VITALS — BODY MASS INDEX: 32.28 KG/M2 | DIASTOLIC BLOOD PRESSURE: 60 MMHG | SYSTOLIC BLOOD PRESSURE: 100 MMHG | WEIGHT: 206.1 LBS

## 2023-12-12 DIAGNOSIS — Z34.83 ENCOUNTER FOR SUPERVISION OF OTHER NORMAL PREGNANCY IN THIRD TRIMESTER: Primary | ICD-10-CM

## 2023-12-12 DIAGNOSIS — Z3A.39 39 WEEKS GESTATION OF PREGNANCY: ICD-10-CM

## 2023-12-12 DIAGNOSIS — O34.219 PREVIOUS CESAREAN DELIVERY AFFECTING PREGNANCY: ICD-10-CM

## 2023-12-12 PROCEDURE — PNV: Performed by: OBSTETRICS & GYNECOLOGY

## 2023-12-12 NOTE — PROGRESS NOTES
Routine Prenatal Visit  [facility] [address] [place] [address] Road    Assessment/Plan:  Lucina Lawrence is a 45y.o. year old  at 39w3d who presents for routine prenatal visit. 1. Encounter for supervision of other normal pregnancy in third trimester    2. Previous  delivery affecting pregnancy    3. 39 weeks gestation of pregnancy          Subjective:     CC: Prenatal care    Angelito Jarvis is a 45 y.o. Parisa Pollack female who presents for routine prenatal care at 39w3d. Pregnancy ROS: no leakage of fluid, pelvic pain, or vaginal bleeding.  good fetal movement. Patient considering scheduling a repeat C/S with BS  Would like to schedule after 40 weeks to give her more time for spontaneous labor  Will call with decision  The following portions of the patient's history were reviewed and updated as appropriate: allergies, current medications, past family history, past medical history, obstetric history, gynecologic history, past social history, past surgical history and problem list.      Objective:  /60   Wt 93.5 kg (206 lb 1.6 oz)   LMP 2023 (Exact Date)   BMI 32.28 kg/m²   Pregravid Weight/BMI: 73.9 kg (163 lb) (BMI 25.52)  Current Weight: 93.5 kg (206 lb 1.6 oz)   Total Weight Gain: 19.6 kg (43 lb 1.6 oz)   Pre- Vitals      Flowsheet Row Most Recent Value   Prenatal Assessment    Fetal Heart Rate 151   Movement Present   Prenatal Vitals    Blood Pressure 100/60   Weight - Scale 93.5 kg (206 lb 1.6 oz)   Urine Albumin/Glucose    Dilation/Effacement/Station    Cervical Dilation 0   Cervical Effacement 50   Fetal Station -2   Vaginal Drainage    Draining Fluid No   Edema    LLE Edema None   RLE Edema None   Facial Edema None             General: Well appearing, no distress  Respiratory: Unlabored breathing  Cardiovascular: Regular rate. Abdomen: Soft, gravid, nontender  Fundal Height: Appropriate for gestational age. Extremities: Warm and well perfused. Non tender.

## 2023-12-12 NOTE — PATIENT INSTRUCTIONS
Fetal Movement   WHAT YOU NEED TO KNOW:   Fetal movements are the kicks, rolls, and hiccups of your unborn baby. You may start to feel these movements when you are 20 weeks pregnant. The movements grow stronger and more frequent as your baby grows. Fetal movements show that your unborn baby is getting the oxygen and nutrients he or she needs before birth. Fewer fetal movements may signal a problem with your baby's health. DISCHARGE INSTRUCTIONS:   Follow up with your doctor or obstetrician as directed:  Write down your questions so you remember to ask them during your visits. Normal fetal movement:  Fetal activity can be described by 4 states, from least to most active. During quiet sleep, your unborn baby may be still for up to 2 hours. During active sleep, he or she kicks, rolls, and moves often. During the quiet awake state, he or she may only move his or her eyes. The active awake state includes strong kicks and rolls. What affects fetal movement:  You may feel your baby move more after you eat, or after you drink caffeine. You may feel your baby move less while you are more active, such as when you exercise. You may also feel fewer movements if you are obese. Certain medicines can change your baby's movements. Tell your healthcare provider about the medicines you are taking. Track fetal movements at home:  Fetal movement is most often felt when you lie quietly on your side. Your healthcare provider may ask you to count movements for 2 hours. He or she may ask you to track how long it takes for your baby to move 10 times. Keep a log of your baby's movements. Contact your doctor or obstetrician if:   It takes longer than usual to feel 8 of your unborn baby's movements. You do not feel your unborn baby move at least 10 times in 2 hours. The skin on your hands, feet, and around your eyes is more swollen than usual.    You have a headache for at least 24 hours.     Tiny red dots appear on your skin.    Your belly is tender when you press on it. You have questions or concerns about your condition or care. Return to the emergency department if:   You do not feel your unborn baby move for 12 hours. You feel cramping or constant pain in your abdomen. You have heavy bleeding from your vagina. You have a severe headache and cannot see clearly. You are having trouble breathing or are vomiting. You have a seizure. © Copyright [facility] 2023 Information is for End User's use only and may not be sold, redistributed or otherwise used for commercial purposes. The above information is an  only. It is not intended as medical advice for individual conditions or treatments. Talk to your doctor, nurse or pharmacist before following any medical regimen to see if it is safe and effective for you. Early Labor Signs   WHAT YOU NEED TO KNOW:   Early labor signs can happen weeks, days, or hours before your baby is ready to be delivered. You may have false labor signs, which are also called [place] Barn[place] contractions. False labor is common and may happen several weeks or days before your actual labor. The contractions are not regular, and do not get closer together. The pain is usually mild, does not worsen, and is felt only in front. [place] Barn[place] contractions may happen later in the day, and stop after you change position, walk, or rest.  DISCHARGE INSTRUCTIONS:   Call 911 for any of the following: You have heavy vaginal bleeding. You cannot get to the hospital before the baby starts to come out. Return to the emergency department if:   You have regular, painful contractions that are less than 5 minutes apart and last 30 to 70 seconds each. You have a constant trickle or sudden gush of clear fluid from your vagina. You notice a sudden decrease in your baby's movement.     Contact your obstetrician or healthcare provider if:   You have pain in your lower back or abdomen that does not get better when you change positions. You have bloody mucus or show. You have questions or concerns about your condition or care. Early Labor signs and symptoms:   Lightening  occurs when your baby drops inside your pelvis. You may feel increased pressure in your pelvis. This may happen a few weeks to a few hours before your labor begins. Contractions  are cramps and tightening that occur in your uterus to help move the baby through your birth canal. Contractions occur regularly and more often each time. Each one lasts about 30 to 70 seconds, and gets stronger until you deliver your baby. Contractions do not go away with movement. The pain usually starts in your lower back and moves to your abdomen. Effacement  occurs when your cervix softens and thins, so it can easily open for the baby. You will not be able to feel effacement. Your healthcare provider will examine your cervix for effacement. Dilation  is widening of your cervix. Your healthcare provider will examine your cervix for dilation. Your cervix may start to dilate weeks before your baby is delivered. Your cervix will be fully opened and ready for delivery when it is dilated to 10 centimeters. Increased discharge  from your vagina may occur. It may be brown, pink, clear, or slightly bloody. This discharge may also be called bloody show. Bloody show is a mucus plug that forms and blocks your cervix during pregnancy. The discharge may mean that your cervix is opening up and getting ready for delivery. Rupture of membranes  is a sudden release of clear fluid from your vagina. Ruptured membranes means your water broke. Your healthcare provider may need to break your water if it does not happen on its own. © Copyright [employer] 2023 Information is for End User's use only and may not be sold, redistributed or otherwise used for commercial purposes. The above information is an  only.  It is not intended as medical advice for individual conditions or treatments. Talk to your doctor, nurse or pharmacist before following any medical regimen to see if it is safe and effective for you.

## 2023-12-18 ENCOUNTER — ANESTHESIA EVENT (INPATIENT)
Dept: LABOR AND DELIVERY | Facility: HOSPITAL | Age: 38
End: 2023-12-18
Payer: COMMERCIAL

## 2023-12-20 ENCOUNTER — ANESTHESIA (INPATIENT)
Dept: LABOR AND DELIVERY | Facility: HOSPITAL | Age: 38
End: 2023-12-20
Payer: COMMERCIAL

## 2023-12-20 ENCOUNTER — HOSPITAL ENCOUNTER (INPATIENT)
Facility: HOSPITAL | Age: 38
LOS: 2 days | Discharge: HOME | End: 2023-12-22
Attending: OBSTETRICS & GYNECOLOGY | Admitting: OBSTETRICS & GYNECOLOGY
Payer: COMMERCIAL

## 2023-12-20 DIAGNOSIS — O34.219 PREVIOUS CESAREAN DELIVERY AFFECTING PREGNANCY: Primary | ICD-10-CM

## 2023-12-20 DIAGNOSIS — Z98.891 S/P REPEAT LOW TRANSVERSE C-SECTION: ICD-10-CM

## 2023-12-20 PROBLEM — K21.9 GASTROESOPHAGEAL REFLUX DISEASE: Status: ACTIVE | Noted: 2023-12-20

## 2023-12-20 LAB
ABO GROUP BLD: NORMAL
BASE EXCESS BLDCOA CALC-SCNC: -6.9 MMOL/L (ref 3–11)
BASE EXCESS BLDCOV CALC-SCNC: -4.6 MMOL/L (ref 1–9)
BASOPHILS # BLD AUTO: 0.05 THOUSANDS/ÂΜL (ref 0–0.1)
BASOPHILS NFR BLD AUTO: 0 % (ref 0–1)
BLD GP AB SCN SERPL QL: NEGATIVE
EOSINOPHIL # BLD AUTO: 0.2 THOUSAND/ÂΜL (ref 0–0.61)
EOSINOPHIL NFR BLD AUTO: 1 % (ref 0–6)
ERYTHROCYTE [DISTWIDTH] IN BLOOD BY AUTOMATED COUNT: 14.2 % (ref 11.6–15.1)
HCO3 BLDCOA-SCNC: 24.7 MMOL/L (ref 17.3–27.3)
HCO3 BLDCOV-SCNC: 23.7 MMOL/L (ref 12.2–28.6)
HCT VFR BLD AUTO: 35.1 % (ref 34.8–46.1)
HGB BLD-MCNC: 11.4 G/DL (ref 11.5–15.4)
HOLD SPECIMEN: YES
IMM GRANULOCYTES # BLD AUTO: 0.31 THOUSAND/UL (ref 0–0.2)
IMM GRANULOCYTES NFR BLD AUTO: 2 % (ref 0–2)
LYMPHOCYTES # BLD AUTO: 1.85 THOUSANDS/ÂΜL (ref 0.6–4.47)
LYMPHOCYTES NFR BLD AUTO: 13 % (ref 14–44)
MCH RBC QN AUTO: 29.3 PG (ref 26.8–34.3)
MCHC RBC AUTO-ENTMCNC: 32.5 G/DL (ref 31.4–37.4)
MCV RBC AUTO: 90 FL (ref 82–98)
MONOCYTES # BLD AUTO: 0.72 THOUSAND/ÂΜL (ref 0.17–1.22)
MONOCYTES NFR BLD AUTO: 5 % (ref 4–12)
NEUTROPHILS # BLD AUTO: 11.31 THOUSANDS/ÂΜL (ref 1.85–7.62)
NEUTS SEG NFR BLD AUTO: 79 % (ref 43–75)
NRBC BLD AUTO-RTO: 0 /100 WBCS
O2 CT VFR BLDCOA CALC: <10 ML/DL
OXYHGB MFR BLDCOA: 10.2 %
OXYHGB MFR BLDCOV: 38.1 %
PCO2 BLDCOA: 76.3 MM[HG] (ref 30–60)
PCO2 BLDCOV: 55.5 MM HG (ref 27–43)
PH BLDCOA: 7.13 [PH] (ref 7.23–7.43)
PH BLDCOV: 7.25 [PH] (ref 7.19–7.49)
PLATELET # BLD AUTO: 234 THOUSANDS/UL (ref 149–390)
PMV BLD AUTO: 11.1 FL (ref 8.9–12.7)
PO2 BLDCOA: <10 MM HG (ref 5–25)
PO2 BLDCOV: 20.6 MM HG (ref 15–45)
RBC # BLD AUTO: 3.89 MILLION/UL (ref 3.81–5.12)
RH BLD: POSITIVE
SAO2 % BLDCOV: 9.5 ML/DL
SPECIMEN EXPIRATION DATE: NORMAL
TREPONEMA PALLIDUM IGG+IGM AB [PRESENCE] IN SERUM OR PLASMA BY IMMUNOASSAY: NORMAL
WBC # BLD AUTO: 14.44 THOUSAND/UL (ref 4.31–10.16)

## 2023-12-20 PROCEDURE — 59510 CESAREAN DELIVERY: CPT | Performed by: OBSTETRICS & GYNECOLOGY

## 2023-12-20 PROCEDURE — 85025 COMPLETE CBC W/AUTO DIFF WBC: CPT | Performed by: OBSTETRICS & GYNECOLOGY

## 2023-12-20 PROCEDURE — 86900 BLOOD TYPING SEROLOGIC ABO: CPT | Performed by: OBSTETRICS & GYNECOLOGY

## 2023-12-20 PROCEDURE — 82805 BLOOD GASES W/O2 SATURATION: CPT | Performed by: OBSTETRICS & GYNECOLOGY

## 2023-12-20 PROCEDURE — 86850 RBC ANTIBODY SCREEN: CPT | Performed by: OBSTETRICS & GYNECOLOGY

## 2023-12-20 PROCEDURE — NC001 PR NO CHARGE: Performed by: OBSTETRICS & GYNECOLOGY

## 2023-12-20 PROCEDURE — 86780 TREPONEMA PALLIDUM: CPT | Performed by: OBSTETRICS & GYNECOLOGY

## 2023-12-20 PROCEDURE — 86901 BLOOD TYPING SEROLOGIC RH(D): CPT | Performed by: OBSTETRICS & GYNECOLOGY

## 2023-12-20 RX ORDER — MELATONIN
2000 DAILY
Status: DISCONTINUED | OUTPATIENT
Start: 2023-12-21 | End: 2023-12-20

## 2023-12-20 RX ORDER — FENTANYL CITRATE 50 UG/ML
INJECTION, SOLUTION INTRAMUSCULAR; INTRAVENOUS AS NEEDED
Status: DISCONTINUED | OUTPATIENT
Start: 2023-12-20 | End: 2023-12-20

## 2023-12-20 RX ORDER — SIMETHICONE 80 MG
80 TABLET,CHEWABLE ORAL 4 TIMES DAILY PRN
Status: DISCONTINUED | OUTPATIENT
Start: 2023-12-20 | End: 2023-12-22 | Stop reason: HOSPADM

## 2023-12-20 RX ORDER — OXYCODONE HYDROCHLORIDE AND ACETAMINOPHEN 5; 325 MG/1; MG/1
1 TABLET ORAL EVERY 4 HOURS PRN
Status: ACTIVE | OUTPATIENT
Start: 2023-12-20 | End: 2023-12-21

## 2023-12-20 RX ORDER — BENZOCAINE/MENTHOL 6 MG-10 MG
1 LOZENGE MUCOUS MEMBRANE DAILY PRN
Status: DISCONTINUED | OUTPATIENT
Start: 2023-12-20 | End: 2023-12-22 | Stop reason: HOSPADM

## 2023-12-20 RX ORDER — ESCITALOPRAM OXALATE 10 MG/1
10 TABLET ORAL DAILY
Status: DISCONTINUED | OUTPATIENT
Start: 2023-12-21 | End: 2023-12-20

## 2023-12-20 RX ORDER — FENTANYL CITRATE 50 UG/ML
INJECTION, SOLUTION INTRAMUSCULAR; INTRAVENOUS
Status: COMPLETED
Start: 2023-12-20 | End: 2023-12-20

## 2023-12-20 RX ORDER — OXYTOCIN/RINGER'S LACTATE 30/500 ML
PLASTIC BAG, INJECTION (ML) INTRAVENOUS CONTINUOUS PRN
Status: DISCONTINUED | OUTPATIENT
Start: 2023-12-20 | End: 2023-12-20

## 2023-12-20 RX ORDER — SACCHAROMYCES BOULARDII 250 MG
250 CAPSULE ORAL 2 TIMES DAILY
Status: DISCONTINUED | OUTPATIENT
Start: 2023-12-20 | End: 2023-12-22 | Stop reason: HOSPADM

## 2023-12-20 RX ORDER — ACETAMINOPHEN 325 MG/1
650 TABLET ORAL EVERY 6 HOURS
Status: DISCONTINUED | OUTPATIENT
Start: 2023-12-20 | End: 2023-12-22 | Stop reason: HOSPADM

## 2023-12-20 RX ORDER — PHENYLEPHRINE HCL IN 0.9% NACL 1 MG/10 ML
SYRINGE (ML) INTRAVENOUS AS NEEDED
Status: DISCONTINUED | OUTPATIENT
Start: 2023-12-20 | End: 2023-12-20

## 2023-12-20 RX ORDER — ONDANSETRON 2 MG/ML
4 INJECTION INTRAMUSCULAR; INTRAVENOUS EVERY 8 HOURS PRN
Status: DISCONTINUED | OUTPATIENT
Start: 2023-12-20 | End: 2023-12-20

## 2023-12-20 RX ORDER — SODIUM CHLORIDE, SODIUM LACTATE, POTASSIUM CHLORIDE, CALCIUM CHLORIDE 600; 310; 30; 20 MG/100ML; MG/100ML; MG/100ML; MG/100ML
125 INJECTION, SOLUTION INTRAVENOUS CONTINUOUS
Status: DISCONTINUED | OUTPATIENT
Start: 2023-12-20 | End: 2023-12-20

## 2023-12-20 RX ORDER — DIPHENHYDRAMINE HCL 25 MG
25 TABLET ORAL EVERY 6 HOURS PRN
Status: DISCONTINUED | OUTPATIENT
Start: 2023-12-20 | End: 2023-12-22 | Stop reason: HOSPADM

## 2023-12-20 RX ORDER — ENOXAPARIN SODIUM 100 MG/ML
40 INJECTION SUBCUTANEOUS DAILY
Status: DISCONTINUED | OUTPATIENT
Start: 2023-12-21 | End: 2023-12-22 | Stop reason: HOSPADM

## 2023-12-20 RX ORDER — MORPHINE SULFATE 0.5 MG/ML
INJECTION, SOLUTION EPIDURAL; INTRATHECAL; INTRAVENOUS AS NEEDED
Status: DISCONTINUED | OUTPATIENT
Start: 2023-12-20 | End: 2023-12-20

## 2023-12-20 RX ORDER — NALOXONE HYDROCHLORIDE 0.4 MG/ML
0.1 INJECTION, SOLUTION INTRAMUSCULAR; INTRAVENOUS; SUBCUTANEOUS
Status: ACTIVE | OUTPATIENT
Start: 2023-12-20 | End: 2023-12-21

## 2023-12-20 RX ORDER — NALBUPHINE HYDROCHLORIDE 10 MG/ML
5 INJECTION, SOLUTION INTRAMUSCULAR; INTRAVENOUS; SUBCUTANEOUS
Status: ACTIVE | OUTPATIENT
Start: 2023-12-20 | End: 2023-12-21

## 2023-12-20 RX ORDER — MORPHINE SULFATE 0.5 MG/ML
INJECTION, SOLUTION EPIDURAL; INTRATHECAL; INTRAVENOUS
Status: COMPLETED
Start: 2023-12-20 | End: 2023-12-20

## 2023-12-20 RX ORDER — CALCIUM CARBONATE 500 MG/1
1000 TABLET, CHEWABLE ORAL DAILY PRN
Status: DISCONTINUED | OUTPATIENT
Start: 2023-12-20 | End: 2023-12-22 | Stop reason: HOSPADM

## 2023-12-20 RX ORDER — IBUPROFEN 600 MG/1
600 TABLET ORAL EVERY 6 HOURS
Status: DISCONTINUED | OUTPATIENT
Start: 2023-12-21 | End: 2023-12-22 | Stop reason: HOSPADM

## 2023-12-20 RX ORDER — HYDROMORPHONE HCL/PF 1 MG/ML
0.5 SYRINGE (ML) INJECTION
Status: ACTIVE | OUTPATIENT
Start: 2023-12-20 | End: 2023-12-21

## 2023-12-20 RX ORDER — BUPIVACAINE HYDROCHLORIDE 7.5 MG/ML
INJECTION, SOLUTION INTRASPINAL AS NEEDED
Status: DISCONTINUED | OUTPATIENT
Start: 2023-12-20 | End: 2023-12-20

## 2023-12-20 RX ORDER — ONDANSETRON 2 MG/ML
INJECTION INTRAMUSCULAR; INTRAVENOUS AS NEEDED
Status: DISCONTINUED | OUTPATIENT
Start: 2023-12-20 | End: 2023-12-20

## 2023-12-20 RX ORDER — FENTANYL CITRATE/PF 50 MCG/ML
25 SYRINGE (ML) INJECTION
Status: ACTIVE | OUTPATIENT
Start: 2023-12-20 | End: 2023-12-20

## 2023-12-20 RX ORDER — OXYTOCIN/RINGER'S LACTATE 30/500 ML
PLASTIC BAG, INJECTION (ML) INTRAVENOUS
Status: COMPLETED
Start: 2023-12-20 | End: 2023-12-20

## 2023-12-20 RX ORDER — SENNOSIDES 8.6 MG
1 TABLET ORAL DAILY
Status: DISCONTINUED | OUTPATIENT
Start: 2023-12-21 | End: 2023-12-22 | Stop reason: HOSPADM

## 2023-12-20 RX ORDER — PHENYLEPHRINE HCL IN 0.9% NACL 1 MG/10 ML
SYRINGE (ML) INTRAVENOUS
Status: COMPLETED
Start: 2023-12-20 | End: 2023-12-20

## 2023-12-20 RX ORDER — ONDANSETRON 2 MG/ML
4 INJECTION INTRAMUSCULAR; INTRAVENOUS EVERY 6 HOURS PRN
Status: ACTIVE | OUTPATIENT
Start: 2023-12-20 | End: 2023-12-21

## 2023-12-20 RX ORDER — ONDANSETRON 2 MG/ML
INJECTION INTRAMUSCULAR; INTRAVENOUS
Status: COMPLETED
Start: 2023-12-20 | End: 2023-12-20

## 2023-12-20 RX ORDER — CEFAZOLIN SODIUM 2 G/50ML
2000 SOLUTION INTRAVENOUS ONCE
Status: COMPLETED | OUTPATIENT
Start: 2023-12-20 | End: 2023-12-20

## 2023-12-20 RX ORDER — MELATONIN
2000 DAILY
Status: DISCONTINUED | OUTPATIENT
Start: 2023-12-20 | End: 2023-12-22 | Stop reason: HOSPADM

## 2023-12-20 RX ORDER — OXYTOCIN/RINGER'S LACTATE 30/500 ML
62.5 PLASTIC BAG, INJECTION (ML) INTRAVENOUS ONCE
Status: COMPLETED | OUTPATIENT
Start: 2023-12-20 | End: 2023-12-20

## 2023-12-20 RX ORDER — ONDANSETRON 2 MG/ML
4 INJECTION INTRAMUSCULAR; INTRAVENOUS ONCE AS NEEDED
Status: ACTIVE | OUTPATIENT
Start: 2023-12-20 | End: 2023-12-20

## 2023-12-20 RX ORDER — KETOROLAC TROMETHAMINE 30 MG/ML
30 INJECTION, SOLUTION INTRAMUSCULAR; INTRAVENOUS EVERY 6 HOURS PRN
Status: DISPENSED | OUTPATIENT
Start: 2023-12-20 | End: 2023-12-21

## 2023-12-20 RX ORDER — OXYCODONE HYDROCHLORIDE 5 MG/1
5 TABLET ORAL EVERY 4 HOURS PRN
Status: DISCONTINUED | OUTPATIENT
Start: 2023-12-21 | End: 2023-12-22 | Stop reason: HOSPADM

## 2023-12-20 RX ORDER — ESCITALOPRAM OXALATE 10 MG/1
10 TABLET ORAL DAILY
Status: DISCONTINUED | OUTPATIENT
Start: 2023-12-20 | End: 2023-12-22 | Stop reason: HOSPADM

## 2023-12-20 RX ORDER — KETOROLAC TROMETHAMINE 30 MG/ML
INJECTION, SOLUTION INTRAMUSCULAR; INTRAVENOUS AS NEEDED
Status: DISCONTINUED | OUTPATIENT
Start: 2023-12-20 | End: 2023-12-20

## 2023-12-20 RX ADMIN — SODIUM CHLORIDE, SODIUM LACTATE, POTASSIUM CHLORIDE, AND CALCIUM CHLORIDE: .6; .31; .03; .02 INJECTION, SOLUTION INTRAVENOUS at 16:05

## 2023-12-20 RX ADMIN — CEFAZOLIN SODIUM 2000 MG: 2 SOLUTION INTRAVENOUS at 15:07

## 2023-12-20 RX ADMIN — ONDANSETRON 4 MG: 2 INJECTION INTRAMUSCULAR; INTRAVENOUS at 15:32

## 2023-12-20 RX ADMIN — ESCITALOPRAM OXALATE 10 MG: 10 TABLET ORAL at 20:25

## 2023-12-20 RX ADMIN — SODIUM CHLORIDE, SODIUM LACTATE, POTASSIUM CHLORIDE, AND CALCIUM CHLORIDE 1000 ML: .6; .31; .03; .02 INJECTION, SOLUTION INTRAVENOUS at 12:00

## 2023-12-20 RX ADMIN — FENTANYL CITRATE 15 MCG: 50 INJECTION INTRAMUSCULAR; INTRAVENOUS at 15:00

## 2023-12-20 RX ADMIN — Medication 62.5 MILLI-UNITS/MIN: at 17:42

## 2023-12-20 RX ADMIN — MORPHINE SULFATE 0.1 MG: 0.5 INJECTION, SOLUTION EPIDURAL; INTRATHECAL; INTRAVENOUS at 15:00

## 2023-12-20 RX ADMIN — SODIUM CHLORIDE, SODIUM LACTATE, POTASSIUM CHLORIDE, AND CALCIUM CHLORIDE: .6; .31; .03; .02 INJECTION, SOLUTION INTRAVENOUS at 15:18

## 2023-12-20 RX ADMIN — Medication 100 MCG: at 15:48

## 2023-12-20 RX ADMIN — Medication 2000 UNITS: at 20:25

## 2023-12-20 RX ADMIN — Medication 100 MCG: at 15:05

## 2023-12-20 RX ADMIN — Medication 250 MILLI-UNITS/MIN: at 15:31

## 2023-12-20 RX ADMIN — KETOROLAC TROMETHAMINE 30 MG: 30 INJECTION, SOLUTION INTRAMUSCULAR at 16:05

## 2023-12-20 RX ADMIN — ACETAMINOPHEN 325MG 650 MG: 325 TABLET ORAL at 17:48

## 2023-12-20 RX ADMIN — SODIUM CHLORIDE, SODIUM LACTATE, POTASSIUM CHLORIDE, AND CALCIUM CHLORIDE 125 ML/HR: .6; .31; .03; .02 INJECTION, SOLUTION INTRAVENOUS at 13:07

## 2023-12-20 RX ADMIN — Medication 250 MG: at 20:25

## 2023-12-20 RX ADMIN — Medication 100 MCG: at 15:10

## 2023-12-20 RX ADMIN — BUPIVACAINE HYDROCHLORIDE IN DEXTROSE 1.6 ML: 7.5 INJECTION, SOLUTION SUBARACHNOID at 15:00

## 2023-12-20 NOTE — DISCHARGE SUMMARY
Discharge Summary - Khushi Cooper 38 y.o. female MRN: [id number]    Unit/Bed#: L&D 316-01 Encounter: [id number]    ADMISSION  Admission Date: 2023   Admitting Attending: Dr. Kelli Hannah MD  Admitting Diagnoses:   Patient Active Problem List   Diagnosis    Anxiety    Fatigue    Tension type headache    39 weeks gestation of pregnancy    Multigravida of advanced maternal age in third trimester    Previous  delivery affecting pregnancy    Back pain affecting pregnancy in third trimester    Gastroesophageal reflux disease    S/P repeat low transverse        DELIVERY  Delivery Method: , Low Transverse    Delivery Date and Time: 2023  3:30 PM   Delivery Attending: Kelli Hannah    DISCHARGE  Discharge Date: 23  Discharge Attending: Dr. Stephen  Discharge Diagnosis:   Same, Delivered    Clinical course: Admission to Delivery  Khushi Cooper is a 38 y.o.  who was admitted at 40w4d for RLTCS.    Delivery  Route of Delivery: , Low Transverse   Reason for  delivery: Elective/Maternal Request;Prior   2     Anesthesia: Spinal ,   QBL:  240    Delivery: , Low Transverse  at 2023  3:30 PM     Baby's Weight: 3530 g (7 lb 12.5 oz) ; 124.52     Apgar scores: 7  and 9  at 1 and 5 minutes, respectively      Clinical Course: Post-Delivery:  The post delivery course was unremarkable.    On the day of discharge, the patient was ambulating, voiding spontaneously, tolerating oral intake, and hemodynamically stable. She was able to reasonably perform all ADLs. She had appropriate bowel function. Pain was well-controlled. She was discharged home on postpartum/postop day #2 without complications. Patient was instructed to follow up with her OB as an outpatient and was given appropriate warnings to call her provider with problems or concerns.    Pertinent lab findings included:   Blood type O positive.     Last three Hgb values:  Lab Results    Component Value Date    HGB 12.1 2023    HGB 11.4 (L) 2023    HGB 11.4 (L) 10/26/2023        Problem-specific follow-up plans included the following:  Problem List       Anxiety    Fatigue    Tension type headache    39 weeks gestation of pregnancy    Multigravida of advanced maternal age in third trimester    Previous  delivery affecting pregnancy    Back pain affecting pregnancy in third trimester    Gastroesophageal reflux disease    * (Principal) S/P repeat low transverse     Overview                  Current Assessment & Plan     Routine postpartum care  Encourage ambulation  Encourage familial bonding  Lactation support as needed  Pain: Motrin/Tylenol around the clock, oxycodone if needed  Postpartum Contraceptive plan: vasectomy for partner  Pre-delivery Hgb 11.4 --> Post Delivery 12.1  Burger catheter: out, spontaneously voiding  DVT Ppx: ambulation, SCDs, Lovenox 40mg             Discharge med list:  Contraception: Partner plans vasectomy     Medication List      ASK your doctor about these medications     aspirin 81 mg EC tablet; Commonly known as: ECOTRIN LOW STRENGTH; Take 2   tablets (162 mg total) by mouth daily   cholecalciferol 1,000 units tablet; Commonly known as: VITAMIN D3   escitalopram 10 mg tablet; Commonly known as: LEXAPRO   PRENATAL 1 PO   PROBIOTIC-10 PO       Condition at discharge:   good     Disposition:   Home    Planned Readmission:   No    Alyssa Dong MD  PGY 1, Obstetrics and Gynecology  2023  12:47 PM

## 2023-12-20 NOTE — ANESTHESIA POSTPROCEDURE EVALUATION
"Post-Op Assessment Note    CV Status:  Stable    Pain management: adequate       Mental Status:  Alert and awake   Hydration Status:  Euvolemic   PONV Controlled:  Controlled   Airway Patency:  Patent     Post Op Vitals Reviewed: Yes      Staff: Anesthesiologist           /66 (BP Location: Right arm)   Pulse 71   Temp (!) 97.4 °F (36.3 °C)   Resp 16   Ht 5' 7\" (1.702 m)   Wt 93.4 kg (206 lb)   LMP 02/26/2023 (Exact Date)   SpO2 96%   BMI 32.26 kg/m²      BP      Temp      Pulse     Resp      SpO2        "

## 2023-12-20 NOTE — OP NOTE
OPERATIVE REPORT  PATIENT NAME: Khushi Cooper    :  1985  MRN: [id number]  Pt Location: [facility] L&D OR ROOM 01    SURGERY DATE: 2023     Section Procedure Note    Indications:   Maternal request for repeat  section    Pre-operative Diagnosis:   40w0d pregnancy  History of  section  Anxiety    Post-operative Diagnosis:   RLTCS     Attending: Kelli Hannah MD  Resident: Alyssa Dong MD    Maternal Findings:  Normal uterus  Normal tubes and ovaries bilaterally  No adhesions  No difficulty noted from skin to delivery     Findings:  Viable male weighing 7lbs 12.5oz;  Apgar scores of 7 at one minute and 9 at five minutes.   Clear amniotic fluid  Normal placenta with 3-vessel cord    Arterial and Venous Gases:  Umbilical Cord Venous Blood Gas:  Results from last 7 days   Lab Units 23  1532   PH COV  7.248   PCO2 COV mm HG 55.5*   HCO3 COV mmol/L 23.7   BASE EXC COV mmol/L -4.6*   O2 CT CD VB mL/dL 9.5   O2 HGB, VENOUS CORD % 38.1     Umbilical Cord Arterial Blood Gas:  Results from last 7 days   Lab Units 23  1532   PH COA  7.128*   PCO2 COA  76.3*   PO2 COA mm HG <10.0   HCO3 COA mmol/L 24.7   BASE EXC COA mmol/L -6.9*   O2 CONTENT CORD ART ml/dl <10.0   O2 HGB, ARTERIAL CORD % 10.2       Specimens: Arterial and venous cord gases, cord blood, segment of umbilical cord, placenta to storage    Quantitative Blood Loss: 240 mL    Fluids: 2 L LR    Drains: Burger catheter           Complications:  None; patient tolerated the procedure well.           Disposition: PACU            Condition: stable    Procedure Details   The patient was seen prior to the procedure. Risks, benefits, possible complications, alternate treatment options, and expected outcomes were discussed with the patient.  The patient agreed with the proposed plan and gave informed consent for a RLTCS.      The patient was taken to the OB Operating Room at 1455 where she received spinal anesthesia. For  infection prophylaxis, she received 2g ancef preoperatively. Fetal heart tones in the OR were assessed and noted to be within normal limits and a Burger catheter and SCDs were placed.  The abdomen was prepped with Chloraprep, the vagina was prepped with Chlorhexidine, and following appropriate drying time, the patient was draped in the usual sterile manner.   A Time Out was held and the above information confirmed.  The patient was identified as Khushi Cooper and the procedure verified as a  Delivery for maternal request.    A Pfannenstiel incision was made and carried down through the underlying subcutaneous tissue to the fascia using a scalpel. The rectus fascia was then nicked in the midline and dissected laterally using Avery scissors.  The superior edge of the  fascial incision was grasped with Kocher clamps bilaterally, tented upward and the underlying rectus muscles were dissected off bluntly and sharply midline with Avery scissors.  This was repeated on the inferior edge of the fascia and dissected down to the pubic rami.  The rectus muscles were  and the peritoneum was identified, entered sharply with Metzenbaum scissors, and extended longitudinally with blunt dissection. The bladder blade was inserted.  A low transverse uterine incision was made with the scalpel and extended in a cephalocaudad manner with blunt dissection. The amnionic sac was ruptured with Aliss clamp and clear fluid was noted.    The fetal head was palpated, elevated, and lifted to the level of the uterine incision. The overlying rectus muscles were noted to be too tight to continue to elevate the fetal head and were dissected laterally with bandage scissors. The infant's head was still at the level of the uterine incision and was not yet expelling despite fundal pressure and elevation. A vacuum extractor was applied to the head of the infant and suction was activated. With gentle traction from the vacuum the fetal head was  delivered through the uterine incision followed by the body without difficulty. Time of birth was noted at 1530. There was noted to be spontaneous cry and good tone. There was no apparent injury to the . The umbilical cord was doubly clamped and cut after 30 seconds to allow for delayed cord clamping.  The infant was handed off to the  providers.  Arterial and venous cord gases, cord blood, and a segment of umbilical cord were obtained for evaluation.  The placenta delivered spontaneously at 1533 with uterine fundal massage and appeared normal. The uterus was exteriorized and cleaned out with a moist lap sponge.     The uterine incision was closed with a running locked suture of 0 Vicryl. A second layer of the same suture was used to imbricate the first.  Hemostasis was noted to be excellent.  The posterior cul de sac was inspected and cleared of clots with a moist lap sponge. The uterus was returned to the abdomen.  The paracolic gutters were inspected and cleared of all clots and debris with moist lap sponges.  The rectus muscles were closed with a suture of 0 Chromic in a running fashion. The fascia was closed with a running suture of 0 Vicryl. Subcutaneous adipose tissue was closed with a running suture of 2-0 Vicryl.  The skin was closed with a subcuticular running suture of 4-0 Monocryl.  Benzoin and steri-strip dressings were applied.      The patient appeared to tolerate the procedure very well.  Lap sponge, needle, and instrument counts were correct x2.  The patient's fundus was palpated and the uterus was expressed. She was then cleaned and transferred to her postpartum recovery room in stable condition and her infant went to the  nursery.    Attending Attestation: Dr. Kelli Hannah MD was present for the entire procedure.  Alyssa Dong MD  OB/GYN PGY-1  2023 4:28 PM

## 2023-12-20 NOTE — H&P
H&P Exam - Obstetrics   Khushi Cooper 38 y.o. female MRN: [id number]  Unit/Bed#: L&D 329-01 Encounter: [id number]        Assessment/Plan:  Khushi Cooper is a 38 y.o.  who presents for repeat C/S       History of Present Illness   Chief Complaint: patient presents at 40w4d for RLTCS  Group: [facility]  Postpartum contraception, vasectomy planned   HPI:  Khushi Cooper is a 38 y.o.  female with an ALEJANDRA of 2023, by Ultrasound at 40w4d weeks gestation who presents with prior C/S x 1 for RLTCS    Pregnancy complications:   Pregnancy Problems (from 23 to present)       Problem Noted Resolved    Previous  delivery affecting pregnancy 2023 by Jaymie Dobbins MD No    40 weeks gestation of pregnancy 2023 by SYBIL Richardson No    Pyelectasis of fetus on prenatal ultrasound 2023 by Emmie Webster MD 10/26/2023 by Meeta Barnett MD    Overview Signed 2023  1:29 PM by Emmie Webtser MD     4 mm bilaterally at 20 weeks                 Review of Systems   Constitutional: Negative.    HENT: Negative.     Eyes: Negative.    Respiratory: Negative.     Cardiovascular: Negative.    Gastrointestinal: Negative.    Genitourinary: Negative.    Musculoskeletal: Negative.    All other systems reviewed and are negative.      Historical Information   OB History    Para Term  AB Living   2 1 1     1   SAB IAB Ectopic Multiple Live Births           1      # Outcome Date GA Lbr Joe/2nd Weight Sex Delivery Anes PTL Lv   2 Current            1 Term 08 40w0d  3033 g (6 lb 11 oz) F CS-LTranv   ZAIN      Complications: Failed Induction       PFSH: The following portions of the patient's history were reviewed and updated as appropriate: allergies, current medications, past family history, past medical history, obstetric history, gynecologic history, past social history, past surgical history and problem list.       Physical Exam  Vitals reviewed.  "  Constitutional:       Appearance: Normal appearance.   HENT:      Head: Normocephalic.      Right Ear: Tympanic membrane normal.      Mouth/Throat:      Mouth: Mucous membranes are moist.   Cardiovascular:      Rate and Rhythm: Normal rate.   Pulmonary:      Effort: Pulmonary effort is normal. No respiratory distress.   Abdominal:      General: Distension: gravid.   Musculoskeletal:         General: Normal range of motion.      Cervical back: Normal range of motion.   Skin:     General: Skin is warm.   Neurological:      General: No focal deficit present.      Mental Status: She is alert.   Psychiatric:         Mood and Affect: Mood normal.         Behavior: Behavior normal.         Cervical Exam deferred  Contractions: occaisonal  Fetal heart rate 140s     Prenatal Labs: I have personally reviewed pertinent reports.  , Blood Type:   Lab Results   Component Value Date/Time    ABO Grouping O 06/07/2023 03:33 PM     , D (Rh type):   Lab Results   Component Value Date/Time    Rh Factor Positive 06/07/2023 03:33 PM     , Antibody Screen: No results found for: \"ANTIBODYSCR\" , HCT/HGB:   Lab Results   Component Value Date/Time    Hematocrit 34.6 (L) 10/26/2023 09:14 AM    Hemoglobin 11.4 (L) 10/26/2023 09:14 AM    Hemoglobin 12.5 06/08/2016 04:15 PM      , MCV:   Lab Results   Component Value Date/Time    MCV 92 10/26/2023 09:14 AM      , Platelets:   Lab Results   Component Value Date/Time    Platelets 249 10/26/2023 09:14 AM      , 1 hour Glucola:   Lab Results   Component Value Date/Time    Glucose 118 09/26/2023 09:50 AM   , Varicella: No results found for: \"VARICELLAIGG\"    , Rubella:   Lab Results   Component Value Date/Time    Rubella IgG Quant 40.2 06/07/2023 03:33 PM        , VDRL/RPR: No results found for: \"RPR\"   , Urine Culture/Screen:   Lab Results   Component Value Date/Time    Urine Culture No Growth <1000 cfu/mL 06/07/2023 03:33 PM       , Hep B:   Lab Results   Component Value Date/Time    Hepatitis B " "Surface Ag Non-reactive 06/07/2023 03:33 PM     , Hep C: No components found for: \"HEPCSAG\", \"EXTHEPCSAG\"   , HIV: No results found for: \"HIVAGAB\"  , Chlamydia: No results found for: \"EXTCHLAMYDIA\"  , Gonorrhea:   Lab Results   Component Value Date/Time    N gonorrhoeae, DNA Probe Negative 06/07/2023 03:20 PM     , Group B Strep:    Lab Results   Component Value Date/Time    Strep Grp B PCR Negative 11/21/2023 10:52 AM           Imaging, EKG, Pathology, and Other Studies: I have personally reviewed pertinent reports.      Kelli Hannah MD  [facility]  [facility]  12/20/2023 11:50 AM    "

## 2023-12-20 NOTE — DISCHARGE INSTRUCTIONS
Self Care After Delivery   AMBULATORY CARE:   The postpartum period is the period of time from delivery to about 6 weeks. During this time you may experience many physical and emotional changes. It is important to understand what is normal and when you need to call your healthcare provider. It is also important to know how to care for yourself during this time.  Call your local emergency number (911 in the [place]) for any of the following:   You see or hear things that are not there, or have thoughts of harming yourself or your baby.     You soak through 1 pad in 15 minutes, have blurry vision, clammy or pale skin, and feel faint.     You faint or lose consciousness.     You have trouble breathing.     You cough up blood.     Your  incision comes apart.     Seek care immediately if:   Your heart is beating faster than usual.     You have a bad headache or changes in your vision.     Your perineal tear, episiotomy site, or  incision is red, swollen, bleeding, or draining pus.     You have severe abdominal pain.     Call your doctor or obstetrician if:   Your leg is painful, red, and larger than usual.     You soak through 1 or more pads in an hour, or pass blood clots larger than a quarter from your vagina.     You have a fever.     You have new or worsening pain in your abdomen or vagina.     You continue to have depression 1 to 2 weeks after you deliver.     You have trouble sleeping.     You have foul-smelling discharge from your vagina.     You have pain or burning when you urinate.     You do not have a bowel movement for 3 days or more.     You have nausea or are vomiting.     You have hard lumps or red streaks over your breasts.     You have cracked nipples or bleed from your nipples.     You have questions or concerns about your condition or care.     Physical changes: The following are normal changes after you give birth:  Pain in the area between your anus and vagina     Breast pain      Constipation or hemorrhoids     Hot or cold flashes     Vaginal bleeding or discharge     Mild to moderate abdominal cramping     Difficulty controlling bowel movements or urine     Emotional changes: A drop in hormone levels after you deliver may cause changes in your emotions. You may feel irritable, sad, or anxious. You may cry easily or for no reason. You may also feel depressed. Depression that continues can be a sign of postpartum depression, a condition that can be treated. Treatment may include talk therapy, medicines, or both. Healthcare providers will ask how you are feeling and if you have any depression. These talks can happen during appointments for your medical care and for your baby's care, such as well child visits. Providers can help you find ways to care for yourself and your baby. Talk to your providers about the following:  When emotional changes or depression started, and if it is getting worse over time     Problems you are having with daily activities, sleep, or caring for your baby     If anything makes you feel worse, or makes you feel better     Feeling that you are not bonding with your baby the way you want     Any problems your baby has with sleeping or feeding     Your baby is fussy or cries a lot     Support you have from friends, family, or others     Breast care for breastfeeding mothers: You may have sore breasts for 3 to 6 days after you give birth. This happens as your milk begins to fill your breasts. You may also have sore breasts if you do not breastfeed frequently. Do the following to care for your breasts:  Apply a moist, warm, compress to your breast as directed. This may help soothe your breasts. Make sure the washcloth is not too hot before you apply it to your breast.     Nurse your baby or pump your milk frequently. This may prevent clogged milk ducts. Ask your healthcare provider how often to nurse or pump.     Massage your breasts as directed. This may help increase  your milk flow. Gently rub your breasts in a circular motion before you breastfeed. You may need to gently squeeze your breast or nipple to help release milk. You can also use a breast pump to help release milk from your breast.     Wash your breasts with warm water only. Do not put soap on your nipples. Soap may cause your nipples to become dry.     Apply lanolin cream to your nipples as directed. Lanolin cream may add moisture to your skin and prevent nipple dryness. Always wash off lanolin cream with warm water before you breastfeed.     Place pads in your bra. Your nipples may leak milk when you are not breastfeeding. You can place pads inside of your bra to help prevent leaking onto your clothing. Ask your healthcare provider where to purchase bra pads.     Get breastfeeding support if needed. Healthcare providers can answer questions about breastfeeding and provide you with support. Ask your healthcare provider who you can contact if you need breastfeeding support.     Breast care for non-breastfeeding mothers: Milk will fill your breasts even if you bottle feed your baby. Do the following to help stop your milk from filling your breasts and causing pain:  Wear a bra with support at all times. A sports bra or a tight-fitting bra will help stop your milk from coming in.     Apply ice on each breast for 15 to 20 minutes every hour or as directed. Use an ice pack, or put crushed ice in a plastic bag. Cover it with a towel before you apply it to your breast. Ice helps your milk ducts shrink.     Keep your breasts away from warm water. Warm water will make it easier for milk to fill your breasts. Stand with your breasts away from warm water in the shower.     Limit how much you touch your breasts. This will prevent them from filling with milk.     Perineum care: Your perineum is the area between your rectum and vagina. It is normal to have swelling and pain in this area after you give birth. If you had an  episiotomy, your healthcare provider may give you special instructions.  Clean your perineum after you use the bathroom. This may prevent infection and help with healing. Use a spray bottle with warm water to clean your perineum. You may also gently spray warm water against your perineum when you urinate. Always wipe front to back.     Take a sitz bath as directed. A sitz bath may help relieve swelling and pain. Fill your bath tub or bucket with water up to your hips and sit in the water. Use cold water for 2 days after you deliver. Then use warm water. Ask your healthcare provider for more information about a sitz bath.     Apply ice packs for the first 24 hours or as directed. Use a plastic glove filled with ice or buy an ice pack. Wrap the ice pack or plastic glove in a small towel or wash cloth. Place the ice pack on your perineum for 20 minutes at a time.     Sit on a donut-shaped pillow. This may relieve pressure on your perineum when you sit.     Use wipes that contain medicine or take pills as directed. Your healthcare provider may tell you to use witch hazel pads. You can place witch hazel pads in the refrigerator before you apply them to your perineum. Your provider may also tell you to take NSAIDs. Ask him or her how often to take pills or use the wipes.     Do not go swimming or take tub baths for 4 to 6 weeks or as directed. This will help prevent an infection in your vagina or uterus.     Bowel and bladder care: It may take 3 to 5 days to have a bowel movement after you deliver your baby. You can do the following to prevent or manage constipation, and get control of your bowel or bladder:  Take stool softeners as directed. A stool softener is medicine that will make your bowel movements softer. This may prevent or relieve constipation. A stool softener may also make bowel movements less painful.     Drink plenty of liquids. Ask how much liquid to drink each day and which liquids are best for you.  Liquids may help prevent constipation.     Eat foods high in fiber. Examples include fruits, vegetables, grains, beans, and lentils. Ask your healthcare provider how much fiber you need each day. Fiber may prevent constipation.          Do Kegel exercises as directed. Kegel exercises will help strengthen the muscles that control bowel movements and urination. Ask your healthcare provider for more information on Kegel exercises.     Apply cold compresses or medicine to hemorrhoids as directed. This may relieve swelling and pain. Your healthcare provider may tell you to apply ice or wipes that contain medicine to your hemorrhoids. He or she may also tell you to use a sitz bath. Ask your provider for more information on how to manage hemorrhoids.     Nutrition: Good nutrition is important in the postpartum period. It will help you return to a healthy weight, increase your energy levels, and prevent constipation. It will also help you get enough nutrients and calories if you are going to breastfeed your baby.  Eat a variety of healthy foods. Healthy foods include fruits, vegetables, whole-grain breads, low-fat dairy products, beans, lean meats, and fish. You may need 500 to 700 extra calories each day if you breastfeed your baby. You may also need extra protein.          Limit foods with added sugar and high amounts of fat. These foods are high in calories and low in healthy nutrients. Read food labels so you know how much sugar and fat is in the food you want to eat.     Drink 8 to 10 glasses of water per day. Water will help you make plenty of milk for your baby. It will also help prevent constipation. Drink a glass of water every time you breastfeed your baby.     Take vitamins as directed. Ask your healthcare provider what vitamins you need.     Limit caffeine and alcohol if you are breastfeeding. Caffeine and alcohol can get into your breast milk. Caffeine and alcohol can make your baby fussy. They can also  interfere with your baby's sleep. Ask your healthcare provider if you can drink alcohol or caffeine.     Rest and sleep: You may feel very tired in the postpartum period. Enough sleep will help you heal and give you energy to care for your baby. The following may help you get sleep and rest:  Nap when your baby naps. Your baby may nap several times during the day. Get rest during this time.     Limit visitors. Many people may want to see you and your baby. Ask friends or family to visit on different days. This will give you time to rest.     Do not plan too much for one day. Put off household chores so that you have time to rest. Gradually do more each day.     Ask for help from family, friends, or neighbors. Ask them to help you with laundry, cleaning, or errands. Also ask someone to watch the baby while you take a nap or relax. Ask your partner to help with the care of your baby. Pump some of your breast milk so your partner can feed your baby during the night.     Exercise after delivery: Wait until your healthcare provider says it is okay to exercise. Exercise can help you lose weight, increase your energy levels, and manage your mood. It can also prevent constipation and blood clots. Start with gentle exercises such as walking. Do more as you have more energy. You may need to avoid abdominal exercises for 1 to 2 weeks after you deliver. Talk to your healthcare provider about an exercise plan that is right for you.     Sexual activity after delivery:   Do not have sex until your healthcare provider says it is okay. You may need to wait 4 to 6 weeks before you have sex. This may prevent infection and allow time to heal.     Your menstrual cycle may begin as soon as 3 weeks after you deliver. Your period may be delayed if you breastfeed your baby. You can become pregnant before you get your first postpartum period. Talk to your healthcare provider about birth control that is right for you. Some types of birth  control are not safe during breastfeeding.     For support and more information: Join a support group for new mothers. Ask for help from family and friends with chores, errands, and care of your baby.  Office of Women's Health,  Department of Health and Human Services  [address] [place] [address]  [address] [place] [address]  Phone: [phone]  Web Address: www.womenshealth.gov  [facility]  [address] [place] [address]  Web Address: http://[email or web address]/[email or web address]-care.aspx  Follow up with your doctor or obstetrician as directed: You will need to follow up within 2 to 6 weeks of delivery. Write down your questions so you remember to ask them at your visits.  © Copyright [employer] 2020 Information is for End User's use only and may not be sold, redistributed or otherwise used for commercial purposes. All illustrations and images included in CareNotes® are the copyrighted property of [employer] or [employer]  The above information is an  only. It is not intended as medical advice for individual conditions or treatments. Talk to your doctor, nurse or pharmacist before following any medical regimen to see if it is safe and effective for you.

## 2023-12-20 NOTE — ANESTHESIA PREPROCEDURE EVALUATION
Procedure:   SECTION () REPEAT (Uterus)    Relevant Problems   ANESTHESIA (within normal limits)      CARDIO (within normal limits)      GI/HEPATIC   (+) Gastroesophageal reflux disease (With pregnancy, meds prn, asymptomatic today)      GYN   (+) 39 weeks gestation of pregnancy   (+) Multigravida of advanced maternal age in third trimester      HEMATOLOGY (within normal limits)      NEURO/PSYCH   (+) Anxiety   (+) Tension type headache        Physical Exam    Airway    Mallampati score: I  TM Distance: >3 FB  Neck ROM: full     Dental       Cardiovascular  Cardiovascular exam normal    Pulmonary  Pulmonary exam normal     Other Findings  post-pubertal.      Anesthesia Plan  ASA Score- 2     Anesthesia Type- spinal with ASA Monitors.         Additional Monitors:     Airway Plan:            Plan Factors-    Chart reviewed.    Patient summary reviewed.                  Induction-     Postoperative Plan-     Informed Consent- Anesthetic plan and risks discussed with patient.

## 2023-12-20 NOTE — ANESTHESIA PROCEDURE NOTES
Spinal Block    Patient location during procedure: OR  Start time: 12/20/2023 3:00 PM  Reason for block: primary anesthetic  Staffing  Performed by: Rupert Villeda MD  Authorized by: Rupert Villeda MD    Preanesthetic Checklist  Completed: patient identified, IV checked, site marked, risks and benefits discussed, surgical consent, monitors and equipment checked, pre-op evaluation and timeout performed  Spinal Block  Patient position: sitting  Prep: Betadine  Patient monitoring: heart rate, continuous pulse ox and frequent blood pressure checks  Location: L3-4  Injection technique: single-shot  Needle  Needle type: pencil-tip   Needle length: 10 cm  Assessment  Sensory level: T4  Injection Assessment:  negative aspiration for heme, no paresthesia on injection and positive aspiration for clear CSF.  Post-procedure:  site cleaned

## 2023-12-21 LAB
ERYTHROCYTE [DISTWIDTH] IN BLOOD BY AUTOMATED COUNT: 14.3 % (ref 11.6–15.1)
HCT VFR BLD AUTO: 36.2 % (ref 34.8–46.1)
HGB BLD-MCNC: 12.1 G/DL (ref 11.5–15.4)
MCH RBC QN AUTO: 30.6 PG (ref 26.8–34.3)
MCHC RBC AUTO-ENTMCNC: 33.4 G/DL (ref 31.4–37.4)
MCV RBC AUTO: 92 FL (ref 82–98)
PLATELET # BLD AUTO: 252 THOUSANDS/UL (ref 149–390)
PMV BLD AUTO: 11.2 FL (ref 8.9–12.7)
RBC # BLD AUTO: 3.95 MILLION/UL (ref 3.81–5.12)
WBC # BLD AUTO: 14.69 THOUSAND/UL (ref 4.31–10.16)

## 2023-12-21 PROCEDURE — 85027 COMPLETE CBC AUTOMATED: CPT

## 2023-12-21 PROCEDURE — 99024 POSTOP FOLLOW-UP VISIT: CPT | Performed by: OBSTETRICS & GYNECOLOGY

## 2023-12-21 RX ADMIN — IBUPROFEN 600 MG: 600 TABLET, FILM COATED ORAL at 23:40

## 2023-12-21 RX ADMIN — Medication 2000 UNITS: at 20:13

## 2023-12-21 RX ADMIN — KETOROLAC TROMETHAMINE 30 MG: 30 INJECTION, SOLUTION INTRAMUSCULAR; INTRAVENOUS at 14:05

## 2023-12-21 RX ADMIN — ACETAMINOPHEN 325MG 650 MG: 325 TABLET ORAL at 11:32

## 2023-12-21 RX ADMIN — ENOXAPARIN SODIUM 40 MG: 40 INJECTION SUBCUTANEOUS at 08:32

## 2023-12-21 RX ADMIN — KETOROLAC TROMETHAMINE 30 MG: 30 INJECTION, SOLUTION INTRAMUSCULAR; INTRAVENOUS at 06:12

## 2023-12-21 RX ADMIN — ACETAMINOPHEN 325MG 650 MG: 325 TABLET ORAL at 18:17

## 2023-12-21 RX ADMIN — PRENATAL VIT W/ FE FUMARATE-FA TAB 27-0.8 MG 1 TABLET: 27-0.8 TAB at 08:33

## 2023-12-21 RX ADMIN — SENNOSIDES 8.6 MG: 8.6 TABLET, FILM COATED ORAL at 08:32

## 2023-12-21 RX ADMIN — ACETAMINOPHEN 325MG 650 MG: 325 TABLET ORAL at 00:29

## 2023-12-21 RX ADMIN — ESCITALOPRAM OXALATE 10 MG: 10 TABLET ORAL at 20:13

## 2023-12-21 RX ADMIN — Medication 250 MG: at 20:13

## 2023-12-21 NOTE — PROGRESS NOTES
Progress Note - OB/GYN   Khushi Cooper 38 y.o. female MRN: [id number]  Unit/Bed#: L&D 316-01 Encounter: [id number]      Assessment/Plan    Khushi Cooper is a 38 y.o.  who is PPD 1 s/p LTCS at 40w4d     * S/P repeat low transverse   Assessment & Plan  Routine postpartum care  Encourage ambulation  Encourage familial bonding  Lactation support as needed  Pain: Motrin/Tylenol around the clock, oxycodone if needed  Postpartum Contraceptive plan: vasectomy for partner  Pre-delivery Hgb 11.4 --> Post Delivery 12.1  Burger catheter: out, pending void trial  DVT Ppx: ambulation, SCDs, Lovenox 40mg       Disposition: Anticipate discharge home postpartum Day #1  Barriers to discharge: ongoing couplet care      Subjective/Objective     Subjective: Postpartum state    Pain: no  Tolerating PO: yes  Voiding: yes  Flatus: no  BM: no  Ambulating: yes  Breastfeeding: Breastfeeding  Chest pain: no  Shortness of breath: no  Leg pain: no  Lochia: minimal    Objective:     Vitals:  Vitals:    23 1940 23 2040 23 2300 23 0300   BP: 102/55 107/54 114/71 98/56   BP Location: Right arm Right arm Right arm Right arm   Pulse: 71 68 72 68   Resp: 18 18 16 18   Temp: 98 °F (36.7 °C) 97.9 °F (36.6 °C) 98 °F (36.7 °C) 97.8 °F (36.6 °C)   TempSrc: Oral Oral Oral Oral   SpO2: 99% 99% 98% 99%   Weight:       Height:           Physical Exam:   GEN: appears well, alert and oriented x 3, pleasant and cooperative   CV: Regular rate  RESP: non labored breathing  ABDOMEN: soft, no tenderness, no distention, Uterine fundus firm and non-tender, -1 cm below the umbilicus, incision c/d/i  EXTREMITIES: non-tender  NEURO Alert and oriented to person, place, and time.       Lab Results   Component Value Date    WBC 14.69 (H) 2023    HGB 12.1 2023    HCT 36.2 2023    MCV 92 2023     2023         Alyssa Dong MD  Obstetrics & Gynecology  23

## 2023-12-21 NOTE — PLAN OF CARE
Problem: PAIN - ADULT  Goal: Verbalizes/displays adequate comfort level or baseline comfort level  Description: Interventions:  - Encourage patient to monitor pain and request assistance  - Assess pain using appropriate pain scale  - Administer analgesics based on type and severity of pain and evaluate response  - Implement non-pharmacological measures as appropriate and evaluate response  - Consider cultural and social influences on pain and pain management  - Notify physician/advanced practitioner if interventions unsuccessful or patient reports new pain  Outcome: Progressing     Problem: INFECTION - ADULT  Goal: Absence or prevention of progression during hospitalization  Description: INTERVENTIONS:  - Assess and monitor for signs and symptoms of infection  - Monitor lab/diagnostic results  - Monitor all insertion sites, i.e. indwelling lines, tubes, and drains  - Monitor endotracheal if appropriate and nasal secretions for changes in amount and color  - [place] appropriate cooling/warming therapies per order  - Administer medications as ordered  - Instruct and encourage patient and family to use good hand hygiene technique  - Identify and instruct in appropriate isolation precautions for identified infection/condition  Outcome: Progressing  Goal: Absence of fever/infection during neutropenic period  Description: INTERVENTIONS:  - Monitor WBC    Outcome: Progressing     Problem: SAFETY ADULT  Goal: Patient will remain free of falls  Description: INTERVENTIONS:  - Educate patient/family on patient safety including physical limitations  - Instruct patient to call for assistance with activity   - Consult OT/PT to assist with strengthening/mobility   - Keep Call bell within reach  - Keep bed low and locked with side rails adjusted as appropriate  - Keep care items and personal belongings within reach  - Initiate and maintain comfort rounds  - Make Fall Risk Sign visible to staff  - Offer Toileting every  Hours,  in advance of need  - Initiate/Maintain alarm  - Obtain necessary fall risk management equipment:   - Apply yellow socks and bracelet for high fall risk patients  - Consider moving patient to room near nurses station  Outcome: Progressing  Goal: Maintain or return to baseline ADL function  Description: INTERVENTIONS:  -  Assess patient's ability to carry out ADLs; assess patient's baseline for ADL function and identify physical deficits which impact ability to perform ADLs (bathing, care of mouth/teeth, toileting, grooming, dressing, etc.)  - Assess/evaluate cause of self-care deficits   - Assess range of motion  - Assess patient's mobility; develop plan if impaired  - Assess patient's need for assistive devices and provide as appropriate  - Encourage maximum independence but intervene and supervise when necessary  - Involve family in performance of ADLs  - Assess for home care needs following discharge   - Consider OT consult to assist with ADL evaluation and planning for discharge  - Provide patient education as appropriate  Outcome: Progressing  Goal: Maintains/Returns to pre admission functional level  Description: INTERVENTIONS:  - Perform AM-PAC 6 Click Basic Mobility/ Daily Activity assessment daily.  - Set and communicate daily mobility goal to care team and patient/family/caregiver.   - Collaborate with rehabilitation services on mobility goals if consulted  - Perform Range of Motion  times a day.  - Reposition patient every  hours.  - Dangle patient  times a day  - Stand patient  times a day  - Ambulate patient  times a day  - Out of bed to chair times a day   - Out of bed for meals  times a day  - Out of bed for toileting  - Record patient progress and toleration of activity level   Outcome: Progressing     Problem: DISCHARGE PLANNING  Goal: Discharge to home or other facility with appropriate resources  Description: INTERVENTIONS:  - Identify barriers to discharge w/patient and caregiver  - Arrange for  needed discharge resources and transportation as appropriate  - Identify discharge learning needs (meds, wound care, etc.)  - Arrange for interpretive services to assist at discharge as needed  - Refer to Case Management Department for coordinating discharge planning if the patient needs post-hospital services based on physician/advanced practitioner order or complex needs related to functional status, cognitive ability, or social support system  Outcome: Progressing     Problem: Knowledge Deficit  Goal: Patient/family/caregiver demonstrates understanding of disease process, treatment plan, medications, and discharge instructions  Description: Complete learning assessment and assess knowledge base.  Interventions:  - Provide teaching at level of understanding  - Provide teaching via preferred learning methods  Outcome: Progressing     Problem: POSTPARTUM  Goal: Experiences normal postpartum course  Description: INTERVENTIONS:  - Monitor maternal vital signs  - Assess uterine involution and lochia  Outcome: Progressing  Goal: Appropriate maternal -  bonding  Description: INTERVENTIONS:  - Identify family support  - Assess for appropriate maternal/infant bonding   -Encourage maternal/infant bonding opportunities  - Referral to  or  as needed  Outcome: Progressing  Goal: Establishment of infant feeding pattern  Description: INTERVENTIONS:  - Assess breast/bottle feeding  - Refer to lactation as needed  Outcome: Progressing  Goal: Incision(s), wounds(s) or drain site(s) healing without S/S of infection  Description: INTERVENTIONS  - Assess and document dressing, incision, wound bed, drain sites and surrounding tissue  - Provide patient and family education  - Perform skin care/dressing changes every   Outcome: Progressing

## 2023-12-21 NOTE — ASSESSMENT & PLAN NOTE
Routine postpartum care  Encourage ambulation  Encourage familial bonding  Lactation support as needed  Pain: Motrin/Tylenol around the clock, oxycodone if needed  Postpartum Contraceptive plan: vasectomy for partner  Pre-delivery Hgb 11.4 --> Post Delivery 12.1  Burger catheter: out, spontaneously voiding  DVT Ppx: ambulation, SCDs, Lovenox 40mg

## 2023-12-21 NOTE — PLAN OF CARE

## 2023-12-22 VITALS
HEIGHT: 67 IN | TEMPERATURE: 97.9 F | HEART RATE: 69 BPM | SYSTOLIC BLOOD PRESSURE: 110 MMHG | WEIGHT: 206 LBS | RESPIRATION RATE: 16 BRPM | BODY MASS INDEX: 32.33 KG/M2 | DIASTOLIC BLOOD PRESSURE: 71 MMHG | OXYGEN SATURATION: 99 %

## 2023-12-22 PROCEDURE — NC001 PR NO CHARGE: Performed by: OBSTETRICS & GYNECOLOGY

## 2023-12-22 PROCEDURE — 99024 POSTOP FOLLOW-UP VISIT: CPT | Performed by: OBSTETRICS & GYNECOLOGY

## 2023-12-22 RX ORDER — IBUPROFEN 600 MG/1
600 TABLET ORAL EVERY 6 HOURS
Qty: 30 TABLET | Refills: 0 | Status: SHIPPED | OUTPATIENT
Start: 2023-12-22

## 2023-12-22 RX ORDER — OXYCODONE HYDROCHLORIDE 5 MG/1
5 TABLET ORAL EVERY 4 HOURS PRN
Qty: 5 TABLET | Refills: 0 | Status: CANCELLED | OUTPATIENT
Start: 2023-12-22 | End: 2024-01-01

## 2023-12-22 RX ORDER — ACETAMINOPHEN 325 MG/1
650 TABLET ORAL EVERY 6 HOURS
Qty: 16 TABLET | Refills: 0 | Status: CANCELLED | OUTPATIENT
Start: 2023-12-22 | End: 2023-12-24

## 2023-12-22 RX ORDER — BENZOCAINE/MENTHOL 6 MG-10 MG
1 LOZENGE MUCOUS MEMBRANE DAILY PRN
Start: 2023-12-22 | End: 2023-12-27

## 2023-12-22 RX ORDER — SENNOSIDES 8.6 MG
8.6 TABLET ORAL DAILY
Start: 2023-12-22

## 2023-12-22 RX ORDER — ACETAMINOPHEN 325 MG/1
650 TABLET ORAL EVERY 6 HOURS
Qty: 14 TABLET | Refills: 0 | Status: SHIPPED | OUTPATIENT
Start: 2023-12-22 | End: 2023-12-24

## 2023-12-22 RX ADMIN — IBUPROFEN 600 MG: 600 TABLET, FILM COATED ORAL at 12:27

## 2023-12-22 RX ADMIN — Medication 250 MG: at 09:12

## 2023-12-22 RX ADMIN — IBUPROFEN 600 MG: 600 TABLET, FILM COATED ORAL at 17:53

## 2023-12-22 RX ADMIN — ENOXAPARIN SODIUM 40 MG: 40 INJECTION SUBCUTANEOUS at 09:12

## 2023-12-22 RX ADMIN — IBUPROFEN 600 MG: 600 TABLET, FILM COATED ORAL at 06:24

## 2023-12-22 RX ADMIN — PRENATAL VIT W/ FE FUMARATE-FA TAB 27-0.8 MG 1 TABLET: 27-0.8 TAB at 09:12

## 2023-12-22 RX ADMIN — ACETAMINOPHEN 325MG 650 MG: 325 TABLET ORAL at 09:12

## 2023-12-22 RX ADMIN — ACETAMINOPHEN 325MG 650 MG: 325 TABLET ORAL at 16:02

## 2023-12-22 RX ADMIN — SENNOSIDES 8.6 MG: 8.6 TABLET, FILM COATED ORAL at 09:12

## 2023-12-22 NOTE — LACTATION NOTE
"This note was copied from a baby's chart.  Mother verbalized breastfeeding is going well. Also C/O sore nipples.  Information given about sore nipples and how to correct with positioning techniques. Discussed maneuvers to latch infant on properly to avoid nipple pain and promote healing.  Discussed treatments that could be utilized to promote healing. Hydro gel dressings given with instruction and verbalization of understanding of cleaning and duration of use. Encouraged calling for latch assistance. Verbal review of  positioning infant up at chest level and starting to feed infant with nose arriving at the nipple. Then, using areolar compression to achieve a deep latch that is comfortable and exchanges optimum amounts of milk.     Discussed risks for early supplementation: over feeding, longer digestion times, engorgement for mom, lower milk supply for mom, and nipple confusion.     Benefits of breast feeding for infant's intestinal tract, less engorgement for mom, protection from multiple disease processes as infant develops, avoidance of over feeding for infant, less nipple confusion, and increased health benefits for mom.       12/22/23 1300   Maternal Information   Has mother  before? Yes   How long did the the mother previously breastfeed? \" a few months\"   Infant to breast within first hour of birth? Yes   Exclusive Pump and Bottle Feed No   LATCH Documentation   Having latch problems? No  (Latch scores = 8 & ; working on consistent deep latch; encouraged latch assistance & outpatient support)   Position(s) Used   (Encouraged positioning based on hand dominance while learning)   Breasts/Nipples   Intervention Nipple shield;Lanolin;Hydrogel pads  (LER - Sore Nipples; Verbal review of deep latch; encoraged calling for latch assistance)   Breastfeeding Progress Not yet established;Breastfeeding well;Other issues (comment)  (Mom choosing early supplementation)   Reasons for not Breastfeeding Maternal " preference   Other OB Lactation Tools   Feeding Devices Pump;Bottle   Breast Pump   Pump 3  ([facility] @ Home)   Patient Follow-Up   Lactation Consult Status 2   Follow-Up Type Inpatient;Call as needed   Other OB Lactation Documentation    Additional Problem Noted Verbal review of good latch/feed; Risks of early supplement  (has BOTH Booklets; LER - Sore Nipples)        Enc.to call for assistance as needed,phone # given. Family support at bedside. Aware of outpatient support available.

## 2023-12-22 NOTE — PROGRESS NOTES
Progress Note - OB/GYN   Khushi Cooper 38 y.o. female MRN: [id number]  Unit/Bed#: L&D 316-01 Encounter: [id number]      Assessment/Plan    Khushi Cooper is a 38 y.o.  who is PPD 2 s/p LTCS at 40w4d     * S/P repeat low transverse   Assessment & Plan  Routine postpartum care  Encourage ambulation  Encourage familial bonding  Lactation support as needed  Pain: Motrin/Tylenol around the clock, oxycodone if needed  Postpartum Contraceptive plan: vasectomy for partner  Pre-delivery Hgb 11.4 --> Post Delivery 12.1  Burger catheter: out, spontaneously voiding  DVT Ppx: ambulation, SCDs, Lovenox 40mg           Disposition: Anticipate discharge home postpartum Day #2-3  Barriers to discharge: ongoing couplet care      Subjective/Objective     Subjective: Postpartum state    Pain: no  Tolerating PO: yes  Voiding: yes  Flatus: yes  BM: no  Ambulating: yes  Breastfeeding: Breastfeeding  Chest pain: no  Shortness of breath: no  Leg pain: no  Lochia: minimal    Objective:     Vitals:  Vitals:    23 1500 23 1900 23 2300 23 0300   BP: 120/55 103/57 104/60 109/67   BP Location:   Right arm Right arm   Pulse: 78 69 68 56   Resp: 16 16 16 16   Temp: 98.4 °F (36.9 °C) 97.5 °F (36.4 °C) 97.7 °F (36.5 °C) 97.6 °F (36.4 °C)   TempSrc:  Oral Temporal Temporal   SpO2: 99% 95% 100% 99%   Weight:       Height:           Physical Exam:   GEN: appears well, alert and oriented x 3, pleasant and cooperative   CV: Regular rate  RESP: non labored breathing  ABDOMEN: soft, no tenderness, no distention, Uterine fundus firm and non-tender, -1 cm below the umbilicus, incision c/d/i  EXTREMITIES: non-tender  NEURO Alert and oriented to person, place, and time.       Lab Results   Component Value Date    WBC 14.69 (H) 2023    HGB 12.1 2023    HCT 36.2 2023    MCV 92 2023     2023         Alyssa Dong MD  Obstetrics & Gynecology  23

## 2023-12-22 NOTE — PLAN OF CARE
Problem: POSTPARTUM  Goal: Experiences normal postpartum course  Description: INTERVENTIONS:  - Monitor maternal vital signs  - Assess uterine involution and lochia  Outcome: Progressing  Goal: Appropriate maternal -  bonding  Description: INTERVENTIONS:  - Identify family support  - Assess for appropriate maternal/infant bonding   -Encourage maternal/infant bonding opportunities  - Referral to  or  as needed  Outcome: Progressing  Goal: Establishment of infant feeding pattern  Description: INTERVENTIONS:  - Assess breast/bottle feeding  - Refer to lactation as needed  Outcome: Progressing  Goal: Incision(s), wounds(s) or drain site(s) healing without S/S of infection  Description: INTERVENTIONS  - Assess and document dressing, incision, wound bed, drain sites and surrounding tissue  - Provide patient and family education  - Perform skin care/dressing changes every   Outcome: Progressing     Problem: PAIN - ADULT  Goal: Verbalizes/displays adequate comfort level or baseline comfort level  Description: Interventions:  - Encourage patient to monitor pain and request assistance  - Assess pain using appropriate pain scale  - Administer analgesics based on type and severity of pain and evaluate response  - Implement non-pharmacological measures as appropriate and evaluate response  - Consider cultural and social influences on pain and pain management  - Notify physician/advanced practitioner if interventions unsuccessful or patient reports new pain  Outcome: Progressing     Problem: INFECTION - ADULT  Goal: Absence or prevention of progression during hospitalization  Description: INTERVENTIONS:  - Assess and monitor for signs and symptoms of infection  - Monitor lab/diagnostic results  - Monitor all insertion sites, i.e. indwelling lines, tubes, and drains  - Monitor endotracheal if appropriate and nasal secretions for changes in amount and color  - [place] appropriate cooling/warming  therapies per order  - Administer medications as ordered  - Instruct and encourage patient and family to use good hand hygiene technique  - Identify and instruct in appropriate isolation precautions for identified infection/condition  Outcome: Progressing  Goal: Absence of fever/infection during neutropenic period  Description: INTERVENTIONS:  - Monitor WBC    Outcome: Progressing     Problem: DISCHARGE PLANNING  Goal: Discharge to home or other facility with appropriate resources  Description: INTERVENTIONS:  - Identify barriers to discharge w/patient and caregiver  - Arrange for needed discharge resources and transportation as appropriate  - Identify discharge learning needs (meds, wound care, etc.)  - Arrange for interpretive services to assist at discharge as needed  - Refer to Case Management Department for coordinating discharge planning if the patient needs post-hospital services based on physician/advanced practitioner order or complex needs related to functional status, cognitive ability, or social support system  Outcome: Progressing     Problem: Knowledge Deficit  Goal: Patient/family/caregiver demonstrates understanding of disease process, treatment plan, medications, and discharge instructions  Description: Complete learning assessment and assess knowledge base.  Interventions:  - Provide teaching at level of understanding  - Provide teaching via preferred learning methods  Outcome: Progressing

## 2023-12-22 NOTE — PLAN OF CARE
Problem: PAIN - ADULT  Goal: Verbalizes/displays adequate comfort level or baseline comfort level  Description: Interventions:  - Encourage patient to monitor pain and request assistance  - Assess pain using appropriate pain scale  - Administer analgesics based on type and severity of pain and evaluate response  - Implement non-pharmacological measures as appropriate and evaluate response  - Consider cultural and social influences on pain and pain management  - Notify physician/advanced practitioner if interventions unsuccessful or patient reports new pain  Outcome: Progressing     Problem: INFECTION - ADULT  Goal: Absence or prevention of progression during hospitalization  Description: INTERVENTIONS:  - Assess and monitor for signs and symptoms of infection  - Monitor lab/diagnostic results  - Monitor all insertion sites, i.e. indwelling lines, tubes, and drains  - Monitor endotracheal if appropriate and nasal secretions for changes in amount and color  - [place] appropriate cooling/warming therapies per order  - Administer medications as ordered  - Instruct and encourage patient and family to use good hand hygiene technique  - Identify and instruct in appropriate isolation precautions for identified infection/condition  Outcome: Progressing  Goal: Absence of fever/infection during neutropenic period  Description: INTERVENTIONS:  - Monitor WBC    Outcome: Progressing     Problem: SAFETY ADULT  Goal: Patient will remain free of falls  Description: INTERVENTIONS:  - Educate patient/family on patient safety including physical limitations  - Instruct patient to call for assistance with activity   - Consult OT/PT to assist with strengthening/mobility   - Keep Call bell within reach  - Keep bed low and locked with side rails adjusted as appropriate  - Keep care items and personal belongings within reach  - Initiate and maintain comfort rounds  - Make Fall Risk Sign visible to staff  - Offer Toileting every  Hours,  in advance of need  - Initiate/Maintain alarm  - Obtain necessary fall risk management equipment:  - Apply yellow socks and bracelet for high fall risk patients  - Consider moving patient to room near nurses station  Outcome: Progressing  Goal: Maintain or return to baseline ADL function  Description: INTERVENTIONS:  -  Assess patient's ability to carry out ADLs; assess patient's baseline for ADL function and identify physical deficits which impact ability to perform ADLs (bathing, care of mouth/teeth, toileting, grooming, dressing, etc.)  - Assess/evaluate cause of self-care deficits   - Assess range of motion  - Assess patient's mobility; develop plan if impaired  - Assess patient's need for assistive devices and provide as appropriate  - Encourage maximum independence but intervene and supervise when necessary  - Involve family in performance of ADLs  - Assess for home care needs following discharge   - Consider OT consult to assist with ADL evaluation and planning for discharge  - Provide patient education as appropriate  Outcome: Progressing  Goal: Maintains/Returns to pre admission functional level  Description: INTERVENTIONS:  - Perform AM-PAC 6 Click Basic Mobility/ Daily Activity assessment daily.  - Set and communicate daily mobility goal to care team and patient/family/caregiver.   - Collaborate with rehabilitation services on mobility goals if consulted  - Perform Range of Motion  times a day.  - Reposition patient every  hours.  - Dangle patient  times a day  - Stand patient  times a day  - Ambulate patient times a day  - Out of bed to chair  times a day   - Out of bed for meals  times a day  - Out of bed for toileting  - Record patient progress and toleration of activity level   Outcome: Progressing     Problem: DISCHARGE PLANNING  Goal: Discharge to home or other facility with appropriate resources  Description: INTERVENTIONS:  - Identify barriers to discharge w/patient and caregiver  - Arrange for  needed discharge resources and transportation as appropriate  - Identify discharge learning needs (meds, wound care, etc.)  - Arrange for interpretive services to assist at discharge as needed  - Refer to Case Management Department for coordinating discharge planning if the patient needs post-hospital services based on physician/advanced practitioner order or complex needs related to functional status, cognitive ability, or social support system  Outcome: Progressing     Problem: Knowledge Deficit  Goal: Patient/family/caregiver demonstrates understanding of disease process, treatment plan, medications, and discharge instructions  Description: Complete learning assessment and assess knowledge base.  Interventions:  - Provide teaching at level of understanding  - Provide teaching via preferred learning methods  Outcome: Progressing     Problem: POSTPARTUM  Goal: Experiences normal postpartum course  Description: INTERVENTIONS:  - Monitor maternal vital signs  - Assess uterine involution and lochia  Outcome: Progressing  Goal: Appropriate maternal -  bonding  Description: INTERVENTIONS:  - Identify family support  - Assess for appropriate maternal/infant bonding   -Encourage maternal/infant bonding opportunities  - Referral to  or  as needed  Outcome: Progressing  Goal: Establishment of infant feeding pattern  Description: INTERVENTIONS:  - Assess breast/bottle feeding  - Refer to lactation as needed  Outcome: Progressing  Goal: Incision(s), wounds(s) or drain site(s) healing without S/S of infection  Description: INTERVENTIONS  - Assess and document dressing, incision, wound bed, drain sites and surrounding tissue  - Provide patient and family education  - Perform skin care/dressing changes every   Outcome: Progressing

## 2023-12-26 NOTE — PROGRESS NOTES
Assessment/Plan:      Diagnoses and all orders for this visit:    Status post  section    Lactating mother      -Reviewed ibuprofen/Tylenol as needed for pain control  -Increase ADLs as tolerated  -[facility]  -Signs and symptoms to report reviewed    RTO for 6 week postpartum exam    Subjective:     Patient ID: Khushi Cooper is a 38 y.o. female.    HPI  here postop day 7 for incision check.  Repeat  section on 23 @ 40w4d.  Male infant weighing 7 pounds 12.5 ounces with Apgars of 7/9.  Pregnancy complicated by prior  section, anxiety/depression, fatigue, back pain and reflux. Feeding breast and formula.  Meets with lactation support tomorrow.  Minimal amount of bleeding.  Denies bladder or bowel concerns.  Was having some anxiety depression symptoms have improved over the past few days with adding formula supplementation for baby.  States baby was eating every half an hour.  Pain is well-controlled with Tylenol and ibuprofen.  Having more pain on the left side of incision.    Last Pap smear 23 NILM/ HR HPV(-)     Review of Systems   Constitutional:  Positive for fatigue. Negative for chills and fever.   Respiratory: Negative.     Cardiovascular: Negative.          Objective:  /68   Wt 90.7 kg (199 lb 14.4 oz)   LMP 2023 (Exact Date)   Breastfeeding Yes Comment: pumping  BMI 31.31 kg/m²      Physical Exam  Constitutional:       Appearance: Normal appearance.   Abdominal:      General: Abdomen is flat.      Palpations: Abdomen is soft.      Comments: Incision clean, dry and intact.  Well-approximated.  Steri-Strips removed.  Ecchymosis along left side.   Neurological:      Mental Status: She is alert and oriented to person, place, and time.   Psychiatric:         Mood and Affect: Mood normal.         Behavior: Behavior normal.

## 2023-12-27 ENCOUNTER — OFFICE VISIT (OUTPATIENT)
Dept: OBGYN CLINIC | Facility: MEDICAL CENTER | Age: 38
End: 2023-12-27

## 2023-12-27 VITALS — BODY MASS INDEX: 31.31 KG/M2 | WEIGHT: 199.9 LBS | SYSTOLIC BLOOD PRESSURE: 116 MMHG | DIASTOLIC BLOOD PRESSURE: 68 MMHG

## 2023-12-27 DIAGNOSIS — Z98.891 STATUS POST CESAREAN SECTION: Primary | ICD-10-CM

## 2023-12-27 PROCEDURE — 99024 POSTOP FOLLOW-UP VISIT: CPT | Performed by: NURSE PRACTITIONER

## 2023-12-28 LAB — PLACENTA IN STORAGE: NORMAL

## 2024-02-02 ENCOUNTER — POSTPARTUM VISIT (OUTPATIENT)
Dept: OBGYN CLINIC | Facility: CLINIC | Age: 39
End: 2024-02-02

## 2024-02-02 VITALS
SYSTOLIC BLOOD PRESSURE: 116 MMHG | BODY MASS INDEX: 29.29 KG/M2 | WEIGHT: 186.6 LBS | DIASTOLIC BLOOD PRESSURE: 60 MMHG | HEIGHT: 67 IN

## 2024-02-02 PROCEDURE — 99024 POSTOP FOLLOW-UP VISIT: CPT | Performed by: OBSTETRICS & GYNECOLOGY

## 2024-02-02 NOTE — PROGRESS NOTES
"Postpartum Visit   [facility]  [address], [address], [place]    Assessment/Plan:  Khushi is a 38 y.o. year old  who presents for postpartum visit.    Routine Postpartum Care  - Normal postpartum exam  - Contraception: vasectomy scheduled  - Depression Screen: neg  - Feeding: formula  - Psychosocial support: appropriate  - Patient Education: Postpartum resources including Pelvic Health PT and Baby & Me  - Cervical cancer screening Up to Date      Additional Problems:  1. Postpartum exam          Subjective:     CC: Postpartum visit    Khushi Cooper is a 38 y.o. female  who presents for a postpartum visit.     She is approximately 6 weeks postpartum following a RLTCS on 2023 at 39 weeks. Postpartum course has been . Bleeding staining only. Bowel function is normal. Bladder function is normal. Patient is not sexually active. Contraception method is vasectomy. Postpartum depression screening: negative.    [facility]. She continues to have clogged ducts, although it's been about 1 month since stopping breastfeeding.     Baby's course has been uncomplicated. Baby is feeding by formula.     The following portions of the patient's history were reviewed and updated as appropriate: allergies, current medications, past family history, past medical history, obstetric history, gynecologic history, past social history, past surgical history and problem list.      Objective:  /60   Ht 5' 7\" (1.702 m)   Wt 84.6 kg (186 lb 9.6 oz)   LMP 2023 (Exact Date)   Breastfeeding No   BMI 29.23 kg/m²   Pregravid Weight/BMI: 73.9 kg (163 lb) (BMI 25.52)  Current Weight: 84.6 kg (186 lb 9.6 oz)   Total Weight Gain: 19.5 kg (43 lb)   Pre- Vitals      Flowsheet Row Most Recent Value   Prenatal Assessment    Prenatal Vitals    Blood Pressure 116/60   Weight - Scale 84.6 kg (186 lb 9.6 oz)   Urine Albumin/Glucose  "   Dilation/Effacement/Station    Vaginal Drainage    Edema              General: Well appearing, no distress.  Mood and affect: Appropriate.  Respiratory: Unlabored breathing. Clear to auscultate.  Cardiovascular: Regular rate and rhythm.  Abdomen: Soft, nontender  Incision: intact, healing well  Extremities: Warm and well perfused.  Non tender.

## 2024-02-21 ENCOUNTER — NURSE TRIAGE (OUTPATIENT)
Age: 39
End: 2024-02-21

## 2024-02-21 NOTE — TELEPHONE ENCOUNTER
"Patient called in reporting lump on left breast.  She states she had lump at her last post partum visit and thought it was a clogged duct at the time but still continues to have it.  She denies fever or pain.  She has stopped breast feeding.  She states she has tried massaging breast and warm compresses but lump will not go away.  Patient was scheduled for a breast check appointment on 2/23/24.  She voiced appreciation and verbalized understanding.      Reason for Disposition   Breast lump    Answer Assessment - Initial Assessment Questions  1. SYMPTOM: \"What's the main symptom you're concerned about?\"  (e.g., lump, pain, rash, nipple discharge)      Breast lump  2. LOCATION: \"Where is the breast located?\"      Left   3. ONSET: \"When did the lump  start?\"      A month a half   4. PRIOR HISTORY: \"Do you have any history of prior problems with your breasts?\" (e.g., lumps, cancer, fibrocystic breast disease)      Denies   5. CAUSE: \"What do you think is causing this symptom?\"      Clogged duct   6. OTHER SYMPTOMS: \"Do you have any other symptoms?\" (e.g., fever, breast pain, redness or rash, nipple discharge)      Denies   7. PREGNANCY-BREASTFEEDING: \"Is there any chance you are pregnant?\" \"When was your last menstrual period?\" \"Are you breastfeeding?\"      Stopped breast feeding.    Protocols used: Breast Symptoms-ADULT-[place]    "

## 2024-02-23 ENCOUNTER — OFFICE VISIT (OUTPATIENT)
Dept: OBGYN CLINIC | Facility: MEDICAL CENTER | Age: 39
End: 2024-02-23
Payer: COMMERCIAL

## 2024-02-23 VITALS
DIASTOLIC BLOOD PRESSURE: 58 MMHG | WEIGHT: 181 LBS | HEIGHT: 67 IN | SYSTOLIC BLOOD PRESSURE: 108 MMHG | BODY MASS INDEX: 28.41 KG/M2

## 2024-02-23 DIAGNOSIS — O92.5 LACTATION SUPPRESSION: ICD-10-CM

## 2024-02-23 DIAGNOSIS — N61.0 MASTITIS, LEFT, ACUTE: Primary | ICD-10-CM

## 2024-02-23 PROCEDURE — 99214 OFFICE O/P EST MOD 30 MIN: CPT | Performed by: STUDENT IN AN ORGANIZED HEALTH CARE EDUCATION/TRAINING PROGRAM

## 2024-02-23 NOTE — ASSESSMENT & PLAN NOTE
- We discussed strategies for lactation suppression including antihistamines, Sudafed, peppermint oil/tea, cold compresses, avoiding expressing milk.

## 2024-02-23 NOTE — PROGRESS NOTES
[facility] [address], [place], [place]    Assessment/Plan:  Khushi Cooper is a 38 y.o.  who presents with lactational mastitis.    Lactation suppression  - We discussed strategies for lactation suppression including antihistamines, Sudafed, peppermint oil/tea, cold compresses, avoiding expressing milk.    Mastitis, left, acute  - We reviewed her physical exam showing three distinct breast lumps in the left breast, likely related to lactational mastitis and weaning.   The lump at 12 o'clock has warmth and erythema, supporting the diagnosis of lactational mastitis.  - We reviewed updated guidance from the society of breastfeeding medicine regarding management of lactation: avoid heat and massage; focus efforts on ice/cold, reducing ihsan-duct inflammation with cold compresses, NSAIDs  - If no improvement in 48 hrs, start dicloxicillin  - Return to office in 2 weeks if breast lumps persist, would pursue breast imaging at this time.    Diagnoses and all orders for this visit:    Mastitis, left, acute  -     dicloxacillin (DYNAPEN) 500 MG capsule; Take 1 capsule (500 mg total) by mouth 4 (four) times a day for 10 days    Lactation suppression          Subjective:   Khushi Cooper is a 38 y.o.  female.  CC: breast lumps    HPI: Khushi presents for three painful breast lumps in the left breast.  She has weaned from breast feeding about 3 weeks ago. She uses heat and massage to try to help with engorgement.  She denies fever or chills, but was treated for lactational mastitis about 6 weeks ago.        ROS: Review of Systems   Constitutional:  Negative for chills and fever.   Respiratory:  Negative for cough and shortness of breath.    Cardiovascular:  Negative for chest pain and leg swelling.   Gastrointestinal:  Negative for abdominal pain, nausea and vomiting.   Genitourinary:  Negative for dysuria, frequency and urgency.   Neurological:  Negative for dizziness,  "light-headedness and headaches.       PFSH: The following portions of the patient's history were reviewed and updated as appropriate: allergies, current medications, past family history, past medical history, obstetric history, gynecologic history, past social history, past surgical history and problem list.       Objective:  /58   Ht 5' 7\" (1.702 m)   Wt 82.1 kg (181 lb)   LMP 02/09/2024 (Exact Date)   BMI 28.35 kg/m²    Physical Exam  Constitutional:       Appearance: Normal appearance.   HENT:      Head: Normocephalic and atraumatic.   Cardiovascular:      Rate and Rhythm: Normal rate.   Pulmonary:      Effort: Pulmonary effort is normal.   Chest:   Breasts:     Breasts are symmetrical.      Right: Normal. No swelling, bleeding, inverted nipple, mass, nipple discharge, skin change or tenderness.      Left: Mass present. No swelling, bleeding, inverted nipple, nipple discharge, skin change or tenderness.      Comments: Three distinct breast lumps in the left breast. At 12 o'clock the mass is warm, tender, and erythematous.  All are mobile.  Abdominal:      General: There is no distension.      Tenderness: There is no abdominal tenderness. There is no guarding.   Lymphadenopathy:      Upper Body:      Right upper body: No axillary adenopathy.      Left upper body: No axillary adenopathy.   Neurological:      Mental Status: She is alert.           Carlie Martin MD  [facility]  [facility]  2/23/2024 8:24 AM    "

## 2024-02-23 NOTE — ASSESSMENT & PLAN NOTE
- We reviewed her physical exam showing three distinct breast lumps in the left breast, likely related to lactational mastitis and weaning.   The lump at 12 o'clock has warmth and erythema, supporting the diagnosis of lactational mastitis.  - We reviewed updated guidance from the society of breastfeeding medicine regarding management of lactation: avoid heat and massage; focus efforts on ice/cold, reducing ihsan-duct inflammation with cold compresses, NSAIDs  - If no improvement in 48 hrs, start dicloxicillin  - Return to office in 2 weeks if breast lumps persist, would pursue breast imaging at this time.

## 2024-03-05 ENCOUNTER — OFFICE VISIT (OUTPATIENT)
Dept: OBGYN CLINIC | Facility: MEDICAL CENTER | Age: 39
End: 2024-03-05
Payer: COMMERCIAL

## 2024-03-05 ENCOUNTER — HOSPITAL ENCOUNTER (EMERGENCY)
Facility: HOSPITAL | Age: 39
Discharge: HOME | End: 2024-03-05
Attending: EMERGENCY MEDICINE
Payer: COMMERCIAL

## 2024-03-05 VITALS
BODY MASS INDEX: 28.83 KG/M2 | OXYGEN SATURATION: 98 % | DIASTOLIC BLOOD PRESSURE: 62 MMHG | WEIGHT: 184.08 LBS | HEART RATE: 61 BPM | TEMPERATURE: 97.8 F | SYSTOLIC BLOOD PRESSURE: 111 MMHG | RESPIRATION RATE: 16 BRPM

## 2024-03-05 VITALS — BODY MASS INDEX: 28.41 KG/M2 | WEIGHT: 181 LBS | HEIGHT: 67 IN

## 2024-03-05 DIAGNOSIS — Z48.00 ABSCESS PACKING REMOVAL: ICD-10-CM

## 2024-03-05 DIAGNOSIS — Z51.89 VISIT FOR WOUND CHECK: Primary | ICD-10-CM

## 2024-03-05 DIAGNOSIS — N61.1 LEFT BREAST ABSCESS: Primary | ICD-10-CM

## 2024-03-05 PROCEDURE — 99282 EMERGENCY DEPT VISIT SF MDM: CPT

## 2024-03-05 PROCEDURE — 99284 EMERGENCY DEPT VISIT MOD MDM: CPT | Performed by: EMERGENCY MEDICINE

## 2024-03-05 PROCEDURE — 96374 THER/PROPH/DIAG INJ IV PUSH: CPT

## 2024-03-05 PROCEDURE — 99213 OFFICE O/P EST LOW 20 MIN: CPT | Performed by: OBSTETRICS & GYNECOLOGY

## 2024-03-05 PROCEDURE — 96375 TX/PRO/DX INJ NEW DRUG ADDON: CPT

## 2024-03-05 RX ORDER — LORAZEPAM 2 MG/ML
0.5 INJECTION INTRAMUSCULAR ONCE
Status: COMPLETED | OUTPATIENT
Start: 2024-03-05 | End: 2024-03-05

## 2024-03-05 RX ORDER — HYDROMORPHONE HCL/PF 1 MG/ML
0.5 SYRINGE (ML) INJECTION ONCE
Status: COMPLETED | OUTPATIENT
Start: 2024-03-05 | End: 2024-03-05

## 2024-03-05 RX ORDER — AMOXICILLIN AND CLAVULANATE POTASSIUM 500; 125 MG/1; MG/1
1 TABLET, FILM COATED ORAL EVERY 12 HOURS SCHEDULED
COMMUNITY
End: 2024-03-12

## 2024-03-05 RX ORDER — ONDANSETRON 2 MG/ML
4 INJECTION INTRAMUSCULAR; INTRAVENOUS ONCE
Status: COMPLETED | OUTPATIENT
Start: 2024-03-05 | End: 2024-03-05

## 2024-03-05 RX ADMIN — Medication 1 APPLICATION: at 15:00

## 2024-03-05 RX ADMIN — HYDROMORPHONE HYDROCHLORIDE 0.5 MG: 0.5 INJECTION, SOLUTION INTRAMUSCULAR; INTRAVENOUS; SUBCUTANEOUS at 16:07

## 2024-03-05 RX ADMIN — ONDANSETRON 4 MG: 2 INJECTION INTRAMUSCULAR; INTRAVENOUS at 16:02

## 2024-03-05 RX ADMIN — LORAZEPAM 0.5 MG: 2 INJECTION INTRAMUSCULAR; INTRAVENOUS at 16:02

## 2024-03-05 NOTE — PROGRESS NOTES
Assessment.PLan  Khushi was seen today for breast problem.    Diagnoses and all orders for this visit:    Left breast abscess  -     Transfer to other facility  -     Ambulatory Referral to Breast Surgery; Future    Offered removal of packing and repacking here at office but patient declined stating needed pain medication for this to occur   We discussed follow  up in ER vs STAT referral to breast specialist   Given acuity of pain  decision made to send to ER for further evaluation and possible need for more in depth intervention  Referral to breast specialist was also made  / ADT 21 order placed   Patient not breastfeeding         Subjective   Khushi Cooper is a 38 y.o. female here for an ER follow  up . She was seen at [facility] ER for breast abscess which was  I&D at bedside after imaging confirmed an abscess /  packing was placed and  patient discharged on PO antibiotics with no scheduled  follow up appointments   Presents today complaining of increased pain on left breast and  unable to remove her packing at home   Denies fevers or chills , still taking antibiotic   Patient Active Problem List   Diagnosis    Anxiety    Fatigue    Tension type headache    39 weeks gestation of pregnancy    Multigravida of advanced maternal age in third trimester    Previous  delivery affecting pregnancy    Back pain affecting pregnancy in third trimester    Gastroesophageal reflux disease    S/P repeat low transverse     Lactation suppression    Mastitis, left, acute       Gynecologic History  Patient's last menstrual period was 2024 (exact date).      Past Medical History:   Diagnosis Date    Anxiety     Depression      Past Surgical History:   Procedure Laterality Date     SECTION      [place]  DELIVERY ONLY N/A 2023    Procedure:  SECTION () REPEAT;  Surgeon: Kelli Hannah MD;  Location: [facility];  Service: Obstetrics    WISDOM TOOTH EXTRACTION Bilateral      Family  History   Problem Relation Age of Onset    No Known Problems Mother     Hiatal hernia Father     Atrial fibrillation Father     Prostate cancer Father     No Known Problems Sister     Bipolar disorder Brother     Anxiety disorder Daughter     No Known Problems Maternal Grandmother     No Known Problems Maternal Grandfather     Breast cancer Paternal Grandmother     Hiatal hernia Paternal Grandmother     No Known Problems Paternal Grandfather     Colon cancer Neg Hx     Ovarian cancer Neg Hx      Social History     Socioeconomic History    Marital status: /Civil Union     Spouse name: Not on file    Number of children: Not on file    Years of education: Not on file    Highest education level: Not on file   Occupational History    Occupation: [employer]   Tobacco Use    Smoking status: Never    Smokeless tobacco: Never   Vaping Use    Vaping status: Never Used   Substance and Sexual Activity    Alcohol use: Yes     Alcohol/week: 1.0 standard drink of alcohol     Types: 1 Glasses of wine per week     Comment: not a frequent drinker    Drug use: No    Sexual activity: Yes     Partners: Male     Birth control/protection: None   Other Topics Concern    Not on file   Social History Narrative    CONSUMES 2 CUPS OF COFFEE PER DY    Birth control not practiced    IUD in place     Social Determinants of Health     Financial Resource Strain: Not on file   Food Insecurity: Not on file   Transportation Needs: Not on file   Physical Activity: Not on file   Stress: Not on file   Social Connections: Not on file   Intimate Partner Violence: Not on file   Housing Stability: Not on file     No Known Allergies  No current facility-administered medications for this visit.    Current Outpatient Medications:     amoxicillin-clavulanate (Augmentin) 500-125 mg per tablet, Take 1 tablet by mouth every 12 (twelve) hours, Disp: , Rfl:     cholecalciferol (VITAMIN D3) 1,000 units tablet, Take 2,000 Units by mouth daily, Disp: , Rfl:      "escitalopram (LEXAPRO) 10 mg tablet, Take 10 mg by mouth daily, Disp: , Rfl:     ibuprofen (MOTRIN) 600 mg tablet, Take 1 tablet (600 mg total) by mouth every 6 (six) hours, Disp: 30 tablet, Rfl: 0    Prenatal MV-Min-Fe Fum-FA-DHA (PRENATAL 1 PO), Take by mouth, Disp: , Rfl:     Probiotic Product (PROBIOTIC-10 PO), Take by mouth, Disp: , Rfl:     senna (SENOKOT) 8.6 mg, Take 1 tablet (8.6 mg total) by mouth daily, Disp: , Rfl:     Review of Systems  Constitutional :no fever, feels well, no tiredness, no recent weight gain or loss  ENT: no ear ache, no loss of hearing, no nosebleeds or nasal discharge, no sore throat or hoarseness.  Cardiovascular: no complaints of slow or fast heart beat, no chest pain, no palpitations, no leg claudication or lower extremity edema.  Respiratory: no complaints of shortness of shortness of breath, no DAWSON  Breasts:as noted in HPI  Gastrointestinal: no complaints of abdominal pain, constipation, nausea, vomiting, or diarrhea or bloody stools  Genitourinary : no complaints of dysuria, incontinence, pelvic pain, no dysmenorrhea, vaginal discharge or abnormal vaginal bleeding and as noted in HPI.  Musculoskeletal: no complaints of arthralgia, no myalgia, no joint swelling or stiffness, no limb pain or swelling.  Integumentary: no complaints of skin rash or lesion, itching or dry skin  Neurological: no complaints of headache, no confusion, no numbness or tingling, no dizziness or fainting     Objective     Ht 5' 7\" (1.702 m)   Wt 82.1 kg (181 lb)   LMP 02/09/2024 (Exact Date)   BMI 28.35 kg/m²     General:   appears stated age, cooperative, alert normal mood and affect/ tearful    Lungs: Unlabored breathing     Breasts: Left breast with approximately  2-3 cm  opening  packing in place , exquisite tenderness to touch   . Diffusely erythematous  and indurated , normal right breast     Skin normal skin turgor and no rashes.   Psychiatric orientation to person, place, and time: normal. mood " and affect: normal

## 2024-03-05 NOTE — ED PROVIDER NOTES
History  Chief Complaint   Patient presents with    Wound Check     I/D to left breast d/t abscess. Coming in for infection as advised by OB     38-year-old female with history of anxiety, depression presents to the ED complaining of left breast pain secondary to packing.  Patient had incision and drainage of left breast abscess done on 3/2/2024.  She had this done at [facility] by one of the surgical residents.  She has been taking the antibiotics twice a day but states that the pain has been excruciating.  She denies fevers, chills or increased drainage from the area.  She went to her OB/GYN today where an ambulatory referral for breast surgery was placed.  She was sent here for further management.      History provided by:  Patient and medical records   used: No    Wound Check   She was treated in the ED 2 to 3 days ago. Previous treatment in the ED includes I&D of abscess. Treatments since wound repair include oral antibiotics. There has been no drainage from the wound. There is no redness present. There is no swelling present. The pain has worsened.       Prior to Admission Medications   Prescriptions Last Dose Informant Patient Reported? Taking?   Prenatal MV-Min-Fe Fum-FA-DHA (PRENATAL 1 PO)   Yes No   Sig: Take by mouth   Probiotic Product (PROBIOTIC-10 PO)   Yes No   Sig: Take by mouth   cholecalciferol (VITAMIN D3) 1,000 units tablet   Yes No   Sig: Take 2,000 Units by mouth daily   dicloxacillin (DYNAPEN) 500 MG capsule   No No   Sig: Take 1 capsule (500 mg total) by mouth 4 (four) times a day for 10 days   escitalopram (LEXAPRO) 10 mg tablet   Yes No   Sig: Take 10 mg by mouth daily   ibuprofen (MOTRIN) 600 mg tablet   No No   Sig: Take 1 tablet (600 mg total) by mouth every 6 (six) hours   senna (SENOKOT) 8.6 mg   No No   Sig: Take 1 tablet (8.6 mg total) by mouth daily      Facility-Administered Medications: None       Past Medical History:   Diagnosis Date    Anxiety      Depression        Past Surgical History:   Procedure Laterality Date     SECTION      [place]  DELIVERY ONLY N/A 2023    Procedure:  SECTION () REPEAT;  Surgeon: Kelli Hannah MD;  Location: [facility];  Service: Obstetrics    WISDOM TOOTH EXTRACTION Bilateral        Family History   Problem Relation Age of Onset    No Known Problems Mother     Hiatal hernia Father     Atrial fibrillation Father     Prostate cancer Father     No Known Problems Sister     Bipolar disorder Brother     Anxiety disorder Daughter     No Known Problems Maternal Grandmother     No Known Problems Maternal Grandfather     Breast cancer Paternal Grandmother     Hiatal hernia Paternal Grandmother     No Known Problems Paternal Grandfather     Colon cancer Neg Hx     Ovarian cancer Neg Hx      I have reviewed and agree with the history as documented.    E-Cigarette/Vaping    E-Cigarette Use Never User      E-Cigarette/Vaping Substances    Nicotine No     THC No     CBD No     Flavoring No     Other No     Unknown No      Social History     Tobacco Use    Smoking status: Never    Smokeless tobacco: Never   Vaping Use    Vaping status: Never Used   Substance Use Topics    Alcohol use: Yes     Alcohol/week: 1.0 standard drink of alcohol     Types: 1 Glasses of wine per week     Comment: not a frequent drinker    Drug use: No       Review of Systems   Constitutional: Negative.  Negative for chills, diaphoresis, fatigue and fever.   HENT: Negative.  Negative for congestion, rhinorrhea and sore throat.    Eyes: Negative.  Negative for discharge, redness and itching.   Respiratory: Negative.  Negative for apnea, cough, chest tightness, shortness of breath and wheezing.    Cardiovascular:  Negative for chest pain, palpitations and leg swelling.   Gastrointestinal: Negative.  Negative for abdominal pain.   Endocrine: Negative.    Genitourinary: Negative.  Negative for flank pain, frequency and urgency.    Musculoskeletal: Negative.  Negative for back pain.   Skin: Negative.    Allergic/Immunologic: Negative.    Neurological: Negative.  Negative for dizziness, syncope, weakness, light-headedness, numbness and headaches.   Hematological: Negative.    All other systems reviewed and are negative.      Physical Exam  Physical Exam  Vitals and nursing note reviewed.   Constitutional:       General: She is awake. She is not in acute distress.     Appearance: Normal appearance. She is well-developed and normal weight. She is not ill-appearing, toxic-appearing or diaphoretic.      Comments: Tearful   HENT:      Head: Normocephalic and atraumatic.      Right Ear: External ear normal.      Left Ear: External ear normal.      Nose: Nose normal.   Eyes:      General: No scleral icterus.        Right eye: No discharge.         Left eye: No discharge.      Conjunctiva/sclera: Conjunctivae normal.   Neck:      Thyroid: No thyromegaly.      Vascular: No JVD.      Trachea: No tracheal deviation.   Cardiovascular:      Rate and Rhythm: Normal rate and regular rhythm.      Heart sounds: Normal heart sounds. No murmur heard.     No friction rub. No gallop.   Pulmonary:      Effort: Pulmonary effort is normal. No respiratory distress.      Breath sounds: Normal breath sounds. No stridor. No wheezing, rhonchi or rales.      Comments: Packing in place outer/upper quadrant left breast.  No active bleeding or purulent drainage.  Minimal surrounding erythema.  Exquisitely tender to the touch.  Chest:      Chest wall: No tenderness.   Skin:     General: Skin is warm and dry.      Coloration: Skin is not jaundiced or pale.      Findings: No bruising, erythema, lesion or rash.   Neurological:      General: No focal deficit present.      Mental Status: She is alert and oriented to person, place, and time.      Motor: No weakness or abnormal muscle tone.      Deep Tendon Reflexes: Reflexes are normal and symmetric.   Psychiatric:         Mood and  Affect: Mood normal.         Behavior: Behavior is cooperative.         Vital Signs  ED Triage Vitals [03/05/24 1425]   Temperature Pulse Respirations Blood Pressure SpO2   97.8 °F (36.6 °C) 61 16 111/62 98 %      Temp Source Heart Rate Source Patient Position - Orthostatic VS BP Location FiO2 (%)   Oral Monitor Sitting Right arm --      Pain Score       --           Vitals:    03/05/24 1425   BP: 111/62   Pulse: 61   Patient Position - Orthostatic VS: Sitting         Visual Acuity      ED Medications  Medications   LET gel 1 Application (has no administration in time range)       Diagnostic Studies  Results Reviewed       None                   No orders to display              Procedures  General Procedure    Date/Time: 3/5/2024 4:17 PM    Performed by: Chanelle Carter DO  Authorized by: Chanelle Carter DO    Patient location:  ED  Consent:     Consent obtained:  Verbal    Risks discussed:  Pain  Indications:     Indications:  Left breast packing removal  Anesthesia (see MAR for exact dosages):     Anesthesia method:  Topical application  Procedure Detail:     Procedure note (site, laterality, method, findings):  Packing removed from left breast incision and drainage site intact.  The site is clean without any purulent drainage or bleeding.  Post-procedure details:     Patient tolerance of procedure:  Tolerated well, no immediate complications           ED Course                                             Medical Decision Making  38-year-old female presents to the ED complaining of worsening pain secondary to packing left breast incision and drainage site.  Patient had breast abscess drained on 3/2/2024 at [facility] by one of the surgical residents.  She states that since having the packing placed the pain has become worse.  She denies fevers or chills or increased drainage from the area.  She has been taking the antibiotics every day.  She went to her OB/GYN today where an ambulatory  referral to breast surgery was placed, however she was referred to the ED for further management.  On exam she is awake and alert but is tearful and seems anxious.  She has pain with even the lightest touch over the incision and drainage area.  The area looks clean with no bleeding or purulent drainage noted and there is very minimal surrounding erythema.  Given the amount of anxiety and pain that the patient is in, will apply topical anesthetic agent over the incision and drainage site.  The patient was told that this will only help the superficial area but not the deep pain.  Will start an IV and give pain medications prior to removal of packing.  Patient and patient's  in agreement with the plan    Risk  Prescription drug management.             Disposition  Final diagnoses:   None     ED Disposition       None          Follow-up Information    None         Patient's Medications   Discharge Prescriptions    No medications on file       No discharge procedures on file.    PDMP Review       None            ED Provider  Electronically Signed by             Chanelle Carter DO  03/05/24 5350

## 2024-03-12 ENCOUNTER — CONSULT (OUTPATIENT)
Dept: SURGICAL ONCOLOGY | Facility: CLINIC | Age: 39
End: 2024-03-12
Payer: COMMERCIAL

## 2024-03-12 VITALS
BODY MASS INDEX: 28.35 KG/M2 | HEART RATE: 87 BPM | SYSTOLIC BLOOD PRESSURE: 120 MMHG | HEIGHT: 67 IN | WEIGHT: 180.6 LBS | OXYGEN SATURATION: 96 % | DIASTOLIC BLOOD PRESSURE: 80 MMHG | TEMPERATURE: 98.5 F

## 2024-03-12 DIAGNOSIS — N61.0 MASTITIS, LEFT, ACUTE: Primary | ICD-10-CM

## 2024-03-12 PROCEDURE — 99204 OFFICE O/P NEW MOD 45 MIN: CPT

## 2024-03-12 RX ORDER — SULFAMETHOXAZOLE AND TRIMETHOPRIM 800; 160 MG/1; MG/1
1 TABLET ORAL EVERY 12 HOURS SCHEDULED
Qty: 14 TABLET | Refills: 0 | Status: SHIPPED | OUTPATIENT
Start: 2024-03-12 | End: 2024-03-19

## 2024-03-12 NOTE — PROGRESS NOTES
Surgical Oncology Consult       [facility] [place]  [address] [facility] [address]  [phone]    Khushi Cooper  1985  [id number]  [facility] [place]  [address] [facility] [address]  [phone]    Diagnoses and all orders for this visit:    Mastitis, left, acute  -     Mammo diagnostic left w 3d & cad; Future  -     US breast left limited (diagnostic); Future  -     sulfamethoxazole-trimethoprim (BACTRIM DS) 800-160 mg per tablet; Take 1 tablet by mouth every 12 (twelve) hours for 7 days  -     Mammo diagnostic bilateral w 3d & cad; Future        Chief Complaint   Patient presents with    Consult       No follow-ups on file.      History of Present Illness: The patient presents to the office today in consultation for left breast lactational mastitis. She reports first noticing symptoms shortly after giving birth in December.  She started with fever and chills, as well as erythema, pain and swelling in the left breast.  She was seen at Urgent Care in January and placed on amoxicillin. At that time she also stopped breastfeeding. She continued to experience symptoms over the next few weeks, and eventually was started on dicloxacillin.  She presented to [facility] ER one week ago with acute worsening of symptoms.  At that time I&D was performed and she was placed on a 4-day course of Augmentin.  Culture results eventually revealed Staph aureus.  She is not currently on any antibiotics but reports that the most recent treatment seemed to give her the most relief.  She denies any fever, chills or redness this time, but still has pain and a several breast masses.  She denies any history of breast issues prior to this episode.  She reports menarche at age 14 and had her first child at age 23.  She reports her paternal grandmother had breast cancer at an unknown  age; denies any other cancer history in her family.  I have calculated her lifetime TC risk to be less than 15%.      Review of Systems   Constitutional:  Negative for activity change, appetite change, chills and fever.   HENT: Negative.     Respiratory: Negative.     Cardiovascular: Negative.    Gastrointestinal: Negative.    Musculoskeletal: Negative.    Skin:         Left breast pain, masses   Neurological: Negative.  Negative for dizziness and headaches.   Hematological: Negative.    Psychiatric/Behavioral: Negative.                 Patient Active Problem List   Diagnosis    Anxiety    Fatigue    Tension type headache    39 weeks gestation of pregnancy    Multigravida of advanced maternal age in third trimester    Previous  delivery affecting pregnancy    Back pain affecting pregnancy in third trimester    Gastroesophageal reflux disease    S/P repeat low transverse     Lactation suppression    Mastitis, left, acute     Past Medical History:   Diagnosis Date    Anxiety     Depression      Past Surgical History:   Procedure Laterality Date     SECTION      [place]  DELIVERY ONLY N/A 2023    Procedure:  SECTION () REPEAT;  Surgeon: Kelli Hannah MD;  Location: [facility];  Service: Obstetrics    WISDOM TOOTH EXTRACTION Bilateral      Family History   Problem Relation Age of Onset    No Known Problems Mother     Hiatal hernia Father     Atrial fibrillation Father     Prostate cancer Father     No Known Problems Sister     Bipolar disorder Brother     Anxiety disorder Daughter     No Known Problems Maternal Grandmother     No Known Problems Maternal Grandfather     Breast cancer Paternal Grandmother     Hiatal hernia Paternal Grandmother     No Known Problems Paternal Grandfather     Colon cancer Neg Hx     Ovarian cancer Neg Hx      Social History     Socioeconomic History    Marital status: /Civil Union     Spouse name: Not on file    Number of children:  Not on file    Years of education: Not on file    Highest education level: Not on file   Occupational History    Occupation: [employer]   Tobacco Use    Smoking status: Never    Smokeless tobacco: Never   Vaping Use    Vaping status: Never Used   Substance and Sexual Activity    Alcohol use: Yes     Alcohol/week: 1.0 standard drink of alcohol     Types: 1 Glasses of wine per week     Comment: not a frequent drinker    Drug use: No    Sexual activity: Yes     Partners: Male     Birth control/protection: None   Other Topics Concern    Not on file   Social History Narrative    CONSUMES 2 CUPS OF COFFEE PER DY    Birth control not practiced    IUD in place     Social Determinants of Health     Financial Resource Strain: Not on file   Food Insecurity: Not on file   Transportation Needs: Not on file   Physical Activity: Not on file   Stress: Not on file   Social Connections: Not on file   Intimate Partner Violence: Not on file   Housing Stability: Not on file       Current Outpatient Medications:     cholecalciferol (VITAMIN D3) 1,000 units tablet, Take 2,000 Units by mouth daily, Disp: , Rfl:     escitalopram (LEXAPRO) 10 mg tablet, Take 10 mg by mouth daily, Disp: , Rfl:     Probiotic Product (PROBIOTIC-10 PO), Take by mouth, Disp: , Rfl:     sulfamethoxazole-trimethoprim (BACTRIM DS) 800-160 mg per tablet, Take 1 tablet by mouth every 12 (twelve) hours for 7 days, Disp: 14 tablet, Rfl: 0  No Known Allergies  Vitals:    03/12/24 1318   BP: 120/80   Pulse: 87   Temp: 98.5 °F (36.9 °C)   SpO2: 96%       Physical Exam  Vitals reviewed.   Constitutional:       General: She is not in acute distress.     Appearance: Normal appearance. She is normal weight. She is not ill-appearing or toxic-appearing.   HENT:      Head: Normocephalic and atraumatic.      Nose: Nose normal.      Mouth/Throat:      Mouth: Mucous membranes are moist.   Eyes:      General: No scleral icterus.     Conjunctiva/sclera: Conjunctivae normal.    Cardiovascular:      Rate and Rhythm: Normal rate.   Pulmonary:      Effort: Pulmonary effort is normal.   Chest:   Breasts:     Right: Normal.      Left: Mass and tenderness present.      Comments: Right breast is smooth and even.  Left breast has a large masslike area concerning for recurrent abscess.  There is no redness, but the breast is very warm.  Prior drainage site shows no signs of infection. There is no drainage.  Musculoskeletal:         General: Normal range of motion.      Cervical back: Normal range of motion and neck supple.   Lymphadenopathy:      Cervical: No cervical adenopathy.      Upper Body:      Right upper body: No supraclavicular, axillary or pectoral adenopathy.      Left upper body: No supraclavicular, axillary or pectoral adenopathy.   Skin:     General: Skin is warm and dry.      Findings: No erythema.   Neurological:      General: No focal deficit present.      Mental Status: She is alert and oriented to person, place, and time.   Psychiatric:         Mood and Affect: Mood normal.         Behavior: Behavior normal.         Thought Content: Thought content normal.         Judgment: Judgment normal.              Imaging  US BREAST FOR ABCESS ONLY    Result Date: 3/3/2024  Narrative: HISTORY: . L breast 12 o clock position: area of incraesed redness, hard skin, TTP, flaking of skin. today is day 3 of 10 of being on abx for lactational mastitis MRN:  [id number]     DATE OF SERVICE:  3/2/2024 10:25 PM                       EXAM DESCRIPTION:  US BREAST FOR ABCESS ONLY   COMPARISON:  No relevant recent priors. Findings: Targeted ultrasound in the right lower quadrant shows complex collection measuring up to 3.0 x 3.0 x 3.8 cm within the left breast region concerning for abscess.    Impression: Impression: Complex collection measuring up to 3.0 x 3.0 x 3.8 cm within the left breast region concerning for abscess. Workstation:[id number]    POCT US SOFT TISSUE    Result Date: 3/2/2024  Narrative:  This procedure was performed by the ordering provider. It has not been reviewed by a radiologist and the results are available in the provider note.    I personally reviewed and interpreted the above laboratory and imaging data.      Discussion/Summary: This is a 37 y/o female 3 months s/p  delivery with acute mastitis.  This was drained one week ago at an outside facility.  On exam I am concerned for possible recurrent abscess collection vs diffuse phlegmon.  Unfortunately I do no have access to an ultrasound at this time. I have discussed with the patient repeat I&D now versus imaging alone; she would prefer the latter.  I will arrange for diagnostic imaging to be performed asap.  Once I have those results, I will call her to discuss possible management in my office, but have made her aware that she may need to be referred to a general surgeon for more extensive intervention.  I will also restart antibiotics at this time.  I don't feel that Augmentin is sufficient for her infection, and will instead order Bactrim.  She has been advised to call with any worsening symptoms.  She is agreeable to the plan, all questions were answered today.

## 2024-03-13 ENCOUNTER — TELEPHONE (OUTPATIENT)
Dept: HEMATOLOGY ONCOLOGY | Facility: CLINIC | Age: 39
End: 2024-03-13

## 2024-03-14 ENCOUNTER — HOSPITAL ENCOUNTER (OUTPATIENT)
Dept: MAMMOGRAPHY | Facility: CLINIC | Age: 39
End: 2024-03-14
Payer: COMMERCIAL

## 2024-03-14 ENCOUNTER — OFFICE VISIT (OUTPATIENT)
Dept: SURGICAL ONCOLOGY | Facility: CLINIC | Age: 39
End: 2024-03-14
Payer: COMMERCIAL

## 2024-03-14 VITALS — HEIGHT: 67 IN | WEIGHT: 180 LBS | BODY MASS INDEX: 28.25 KG/M2

## 2024-03-14 VITALS
WEIGHT: 180.5 LBS | BODY MASS INDEX: 28.33 KG/M2 | HEIGHT: 67 IN | DIASTOLIC BLOOD PRESSURE: 72 MMHG | SYSTOLIC BLOOD PRESSURE: 110 MMHG | OXYGEN SATURATION: 98 % | TEMPERATURE: 97.5 F | RESPIRATION RATE: 18 BRPM | HEART RATE: 89 BPM

## 2024-03-14 DIAGNOSIS — N61.0 MASTITIS, LEFT, ACUTE: ICD-10-CM

## 2024-03-14 DIAGNOSIS — N61.1 LEFT BREAST ABSCESS: Primary | ICD-10-CM

## 2024-03-14 PROCEDURE — G0279 TOMOSYNTHESIS, MAMMO: HCPCS

## 2024-03-14 PROCEDURE — 77066 DX MAMMO INCL CAD BI: CPT

## 2024-03-14 PROCEDURE — 99214 OFFICE O/P EST MOD 30 MIN: CPT

## 2024-03-14 PROCEDURE — 10060 I&D ABSCESS SIMPLE/SINGLE: CPT

## 2024-03-14 PROCEDURE — 76642 ULTRASOUND BREAST LIMITED: CPT

## 2024-03-14 NOTE — LETTER
March 14, 2024     Jaymie Dobbins MD  [address] [facility]  [address] [place] [id number]    Patient: Khushi Cooper   YOB: 1985   Date of Visit: 3/14/2024       Dear Dr. Dobbins:    Thank you for referring Khushi Cooper to me for evaluation. Below are my notes for this consultation.    If you have questions, please do not hesitate to call me. I look forward to following your patient along with you.         Sincerely,        SYBIL Fulton        CC: No Recipients    SYBIL Fulton  3/14/2024  4:53 PM  Sign when Signing Visit               Surgical Oncology Follow Up       [address] [place] [address] SURGICAL ONCOLOGY [address] [address]    Khushi Cooper  1985  [id number]  [address] [place] [address] SURGICAL ONCOLOGY [address] [address]    Diagnoses and all orders for this visit:    Left breast abscess        Chief Complaint   Patient presents with   • Follow-up       Return in about 1 week (around 3/21/2024) for Office Visit.      History of Present Illness: The patient presents to the office today for I&D of left breast abscesses.  She underwent diagnostic breast imaging earlier today which revealed two distinct fluid collections.  She states the breast is painful, hot and red.  Denies any fever or chills, nausea or vomiting.  She has started the Bactrim as ordered.      Review of Systems   Constitutional:  Negative for activity change, appetite change, chills, diaphoresis and fever.   HENT: Negative.     Respiratory: Negative.  Negative for cough and shortness of breath.    Cardiovascular: Negative.    Gastrointestinal: Negative.  Negative for abdominal pain, nausea and vomiting.   Musculoskeletal: Negative.    Skin:  Positive for color change and wound.   Neurological: Negative.  Negative for dizziness and headaches.   Hematological: Negative.     Psychiatric/Behavioral: Negative.               Patient Active Problem List   Diagnosis   • Anxiety   • Fatigue   • Tension type headache   • 39 weeks gestation of pregnancy   • Multigravida of advanced maternal age in third trimester   • Previous  delivery affecting pregnancy   • Back pain affecting pregnancy in third trimester   • Gastroesophageal reflux disease   • S/P repeat low transverse    • Lactation suppression   • Mastitis, left, acute     Past Medical History:   Diagnosis Date   • Anxiety    • Depression      Past Surgical History:   Procedure Laterality Date   •  SECTION     • [place]  [place] N[place]A 2023    Procedure:  SECTION () REPEAT;  Surgeon: Kelli Hannah MD;  Location: [facility];  Service: Obstetrics   • WISDOM TOOTH EXTRACTION Bilateral      Family History   Problem Relation Age of Onset   • No Known Problems Mother    • Hiatal hernia Father    • Atrial fibrillation Father    • Prostate cancer Father         age dx unknown   • No Known Problems Sister    • Anxiety disorder Daughter    • No Known Problems Maternal Grandmother    • No Known Problems Maternal Grandfather    • Breast cancer Paternal Grandmother         age dx unknown   • Hiatal hernia Paternal Grandmother    • No Known Problems Paternal Grandfather    • Bipolar disorder Brother    • No Known Problems Maternal Aunt    • Colon cancer Neg Hx    • Ovarian cancer Neg Hx      Social History     Socioeconomic History   • Marital status: /Civil Union     Spouse name: Not on file   • Number of children: Not on file   • Years of education: Not on file   • Highest education level: Not on file   Occupational History   • Occupation: [employer]   Tobacco Use   • Smoking status: Never   • Smokeless tobacco: Never   Vaping Use   • Vaping status: Never Used   Substance and Sexual Activity   • Alcohol use: Yes     Alcohol/week: 1.0 standard drink of alcohol     Types: 1 Glasses of wine per  week     Comment: not a frequent drinker   • Drug use: No   • Sexual activity: Yes     Partners: Male     Birth control/protection: None   Other Topics Concern   • Not on file   Social History Narrative    CONSUMES 2 CUPS OF COFFEE PER DY    Birth control not practiced    IUD in place     Social Determinants of Health     Financial Resource Strain: Not on file   Food Insecurity: Not on file   Transportation Needs: Not on file   Physical Activity: Not on file   Stress: Not on file   Social Connections: Not on file   Intimate Partner Violence: Not on file   Housing Stability: Not on file       Current Outpatient Medications:   •  cholecalciferol (VITAMIN D3) 1,000 units tablet, Take 2,000 Units by mouth daily, Disp: , Rfl:   •  escitalopram (LEXAPRO) 10 mg tablet, Take 10 mg by mouth daily, Disp: , Rfl:   •  Probiotic Product (PROBIOTIC-10 PO), Take by mouth, Disp: , Rfl:   •  sulfamethoxazole-trimethoprim (BACTRIM DS) 800-160 mg per tablet, Take 1 tablet by mouth every 12 (twelve) hours for 7 days, Disp: 14 tablet, Rfl: 0  No Known Allergies  Vitals:    03/14/24 1041   BP: 110/72   Pulse: 89   Resp: 18   Temp: 97.5 °F (36.4 °C)   SpO2: 98%       Physical Exam  Vitals reviewed.   Constitutional:       General: She is not in acute distress.     Appearance: Normal appearance. She is normal weight. She is not ill-appearing or toxic-appearing.   HENT:      Head: Normocephalic and atraumatic.      Nose: Nose normal.      Mouth/Throat:      Mouth: Mucous membranes are moist.   Eyes:      General: No scleral icterus.     Conjunctiva/sclera: Conjunctivae normal.   Cardiovascular:      Rate and Rhythm: Normal rate.   Pulmonary:      Effort: Pulmonary effort is normal.   Chest:          Comments: Two large palpable fluid collections present in left breast with mild erythema present.  Musculoskeletal:         General: Normal range of motion.      Cervical back: Normal range of motion and neck supple.   Lymphadenopathy:       Cervical: No cervical adenopathy.   Skin:     General: Skin is warm and dry.      Findings: Erythema present.   Neurological:      General: No focal deficit present.      Mental Status: She is alert and oriented to person, place, and time.   Psychiatric:         Mood and Affect: Mood normal.         Behavior: Behavior normal.         Thought Content: Thought content normal.         Judgment: Judgment normal.         Imaging  US breast left limited (diagnostic), Mammo diagnostic bilateral w 3d & cad    Result Date: 3/14/2024  Narrative: DIAGNOSIS: Mastitis, left, acute TECHNIQUE: Digital diagnostic mammography was performed. Computer Aided Detection (CAD) analyzed all applicable images. Left breast ultrasound was performed. COMPARISONS: Prior breast imaging dated: 03/02/2024 RELEVANT HISTORY:  Family Breast Cancer History: History of breast cancer in Paternal Grandmother. Family Medical History: Family medical history includes breast cancer in paternal grandmother. Personal History: Hormone history includes birth control. No known relevant surgical history. No known relevant medical history. RISK ASSESSMENT: 5 Year Tyrer-Cuzick: 0.71% 10 Year Tyrer-Cuzick: 1.86% Lifetime Tyrer-Cuzick: 17.22% TISSUE DENSITY: The breasts are extremely dense, which lowers the sensitivity of mammography. INDICATION: Khushi Cooper is a 38 y.o. female presenting for left breast mastitis. FINDINGS: Patient presents for mastitis with history of left breast 12:00 collection drainage 1 week prior at an outside institution.  There is concern for recurrence of the collection. There are 2 well-circumscribed masses in the upper central and inner central left breast.  There are no suspicious masses, microcalcification, skin thickening or architectural distortions in the right breast.  There are scattered calcifications present bilaterally. Targeted sonographic evaluation of the 12 o'clock position retroareolar left breast demonstrates a 4.5 x  4.2 x 2.9 cm near anechoic collection with floating internal debris and peripheral vascularity.  No evident internal vascularity. Targeted sonographic evaluation of the left breast 3 o'clock position 8 cm from the nipple reveals a 5.1 x 2.8 x 4.3 cm hypoechoic collection with internal debris and peripheral vascularity.  No evident internal vascularity.     Impression:  2 well-circumscribed collections at the left breast 12:00 and left breast 3 o'clock position with low-level internal echoes and peripheral vascularity suspicious for abscesses.  Close clinical follow-up is recommended to ensure resolution of this finding.  6-month follow-up ultrasound is recommended to ensure resolution.  The patient is set up for an appointment with breast surgery later this morning. 2.  No evidence of malignancy. ASSESSMENT/BI-RADS CATEGORY: Left: 3 - Probably Benign Right: 2 - Benign Overall: 3 - Probably Benign RECOMMENDATION:      - Ultrasound in 6 months for the left breast.      - Clinical management for the left breast.      - Routine screening mammogram at age 40 for the right breast. Workstation ID: [id number]    US BREAST FOR ABCESS ONLY    Result Date: 3/3/2024  Narrative: HISTORY: . L breast 12 o clock position: area of incraesed redness, hard skin, TTP, flaking of skin. today is day 3 of 10 of being on abx for lactational mastitis MRN:  [id number]     DATE OF SERVICE:  3/2/2024 10:25 PM                       EXAM DESCRIPTION:  US BREAST FOR ABCESS ONLY   COMPARISON:  No relevant recent priors. Findings: Targeted ultrasound in the right lower quadrant shows complex collection measuring up to 3.0 x 3.0 x 3.8 cm within the left breast region concerning for abscess.    Impression: Impression: Complex collection measuring up to 3.0 x 3.0 x 3.8 cm within the left breast region concerning for abscess. Workstation:[id number]    POCT US SOFT TISSUE    Result Date: 3/2/2024  Narrative: This procedure was performed by the ordering  "provider. It has not been reviewed by a radiologist and the results are available in the provider note.    I personally reviewed and interpreted the above laboratory and imaging data.      Incision and drain    Date/Time: 3/14/2024 9:45 AM    Performed by: SYBIL Fulton  Authorized by: SYBIL Fulton  Universal Protocol:  Risks and benefits: risks, benefits and alternatives were discussed  Consent given by: patient  Time out: Immediately prior to procedure a \"time out\" was called to verify the correct patient, procedure, equipment, support staff and site/side marked as required.  Patient understanding: patient states understanding of the procedure being performed  Patient consent: the patient's understanding of the procedure matches consent given  Radiology Images displayed and confirmed. If images not available, report reviewed: imaging studies available  Patient identity confirmed: verbally with patient and provided demographic data    Location:     Type:  Abscess    Location:  Trunk    Trunk location:  L breast  Comments:      Left breast fluid collections identified at the 12:00 and 3:00 positions.  The breast was prepped with povidine-iodine, and 5cc of 1% lidocaine with epi was injected at both the 12:00 and 3:00 sites.  An additional 10cc of 1% lidocaine was injected at the 10:00 periareolar position.  Using an #11 scalpel, the lateral breast was incised, and copious amount of purulent and bloody drainage was evacuated.  Wound was irrigated with 20cc sterile saline and 1/2 iodoform packing was placed.  The upper inner periareolar space was then opened and copious amount of thick bloody drainage was evacuated.  1/2\" iodoform packing was placed.  Breast was significantly softer at this point.  A small firm area remains at the 11:00-12:00 position, likely residual hematoma from prior I&D.  This was discussed with the patient, and it was decided not to make any additional incisions.  Patient " tolerated the procedure very well and was provided with instructions and supplies for care at home.       Discussion/Summary: This is a 39 y/o female with left breast abscess, presenting today for I&D.  Following evacuation of fluid collections, there does still appear to be a hematoma present.  I have advised patient to use warm compresses and massage on the area, to take Bactrim to completion, and to change the packing daily.  I will plan to see her again next week for re-evaluation. All questions were answered today.

## 2024-03-14 NOTE — PROGRESS NOTES
Surgical Oncology Follow Up       [facility] SURGICAL ONCOLOGY [place]  [address]  [place] [address]    Khushi Cooper  1985  [id number]  [facility]COL[facility] [place]  [address]  [place] [address]    Diagnoses and all orders for this visit:    Left breast abscess        Chief Complaint   Patient presents with    Follow-up       Return in about 1 week (around 3/21/2024) for Office Visit.      History of Present Illness: The patient presents to the office today for I&D of left breast abscesses.  She underwent diagnostic breast imaging earlier today which revealed two distinct fluid collections.  She states the breast is painful, hot and red.  Denies any fever or chills, nausea or vomiting.  She has started the Bactrim as ordered.      Review of Systems   Constitutional:  Negative for activity change, appetite change, chills, diaphoresis and fever.   HENT: Negative.     Respiratory: Negative.  Negative for cough and shortness of breath.    Cardiovascular: Negative.    Gastrointestinal: Negative.  Negative for abdominal pain, nausea and vomiting.   Musculoskeletal: Negative.    Skin:  Positive for color change and wound.   Neurological: Negative.  Negative for dizziness and headaches.   Hematological: Negative.    Psychiatric/Behavioral: Negative.               Patient Active Problem List   Diagnosis    Anxiety    Fatigue    Tension type headache    39 weeks gestation of pregnancy    Multigravida of advanced maternal age in third trimester    Previous  delivery affecting pregnancy    Back pain affecting pregnancy in third trimester    Gastroesophageal reflux disease    S/P repeat low transverse     Lactation suppression    Mastitis, left, acute     Past Medical History:   Diagnosis Date    Anxiety     Depression      Past Surgical History:   Procedure Laterality Date      SECTION  2008    [place] N/A 2023    Procedure:  SECTION () REPEAT;  Surgeon: Kelli Hannah MD;  Location: [facility];  Service: Obstetrics    WISDOM TOOTH EXTRACTION Bilateral      Family History   Problem Relation Age of Onset    No Known Problems Mother     Hiatal hernia Father     Atrial fibrillation Father     Prostate cancer Father         age dx unknown    No Known Problems Sister     Anxiety disorder Daughter     No Known Problems Maternal Grandmother     No Known Problems Maternal Grandfather     Breast cancer Paternal Grandmother         age dx unknown    Hiatal hernia Paternal Grandmother     No Known Problems Paternal Grandfather     Bipolar disorder Brother     No Known Problems Maternal Aunt     Colon cancer Neg Hx     Ovarian cancer Neg Hx      Social History     Socioeconomic History    Marital status: /Civil Union     Spouse name: Not on file    Number of children: Not on file    Years of education: Not on file    Highest education level: Not on file   Occupational History    Occupation: [employer]   Tobacco Use    Smoking status: Never    Smokeless tobacco: Never   Vaping Use    Vaping status: Never Used   Substance and Sexual Activity    Alcohol use: Yes     Alcohol/week: 1.0 standard drink of alcohol     Types: 1 Glasses of wine per week     Comment: not a frequent drinker    Drug use: No    Sexual activity: Yes     Partners: Male     Birth control/protection: None   Other Topics Concern    Not on file   Social History Narrative    CONSUMES 2 CUPS OF COFFEE PER DY    Birth control not practiced    IUD in place     Social Determinants of Health     Financial Resource Strain: Not on file   Food Insecurity: Not on file   Transportation Needs: Not on file   Physical Activity: Not on file   Stress: Not on file   Social Connections: Not on file   Intimate Partner Violence: Not on file   Housing Stability: Not on file       Current Outpatient  Medications:     cholecalciferol (VITAMIN D3) 1,000 units tablet, Take 2,000 Units by mouth daily, Disp: , Rfl:     escitalopram (LEXAPRO) 10 mg tablet, Take 10 mg by mouth daily, Disp: , Rfl:     Probiotic Product (PROBIOTIC-10 PO), Take by mouth, Disp: , Rfl:     sulfamethoxazole-trimethoprim (BACTRIM DS) 800-160 mg per tablet, Take 1 tablet by mouth every 12 (twelve) hours for 7 days, Disp: 14 tablet, Rfl: 0  No Known Allergies  Vitals:    03/14/24 1041   BP: 110/72   Pulse: 89   Resp: 18   Temp: 97.5 °F (36.4 °C)   SpO2: 98%       Physical Exam  Vitals reviewed.   Constitutional:       General: She is not in acute distress.     Appearance: Normal appearance. She is normal weight. She is not ill-appearing or toxic-appearing.   HENT:      Head: Normocephalic and atraumatic.      Nose: Nose normal.      Mouth/Throat:      Mouth: Mucous membranes are moist.   Eyes:      General: No scleral icterus.     Conjunctiva/sclera: Conjunctivae normal.   Cardiovascular:      Rate and Rhythm: Normal rate.   Pulmonary:      Effort: Pulmonary effort is normal.   Chest:          Comments: Two large palpable fluid collections present in left breast with mild erythema present.  Musculoskeletal:         General: Normal range of motion.      Cervical back: Normal range of motion and neck supple.   Lymphadenopathy:      Cervical: No cervical adenopathy.   Skin:     General: Skin is warm and dry.      Findings: Erythema present.   Neurological:      General: No focal deficit present.      Mental Status: She is alert and oriented to person, place, and time.   Psychiatric:         Mood and Affect: Mood normal.         Behavior: Behavior normal.         Thought Content: Thought content normal.         Judgment: Judgment normal.         Imaging  US breast left limited (diagnostic), Mammo diagnostic bilateral w 3d & cad    Result Date: 3/14/2024  Narrative: DIAGNOSIS: Mastitis, left, acute TECHNIQUE: Digital diagnostic mammography was  performed. Computer Aided Detection (CAD) analyzed all applicable images. Left breast ultrasound was performed. COMPARISONS: Prior breast imaging dated: 03/02/2024 RELEVANT HISTORY:  Family Breast Cancer History: History of breast cancer in Paternal Grandmother. Family Medical History: Family medical history includes breast cancer in paternal grandmother. Personal History: Hormone history includes birth control. No known relevant surgical history. No known relevant medical history. RISK ASSESSMENT: 5 Year Tyrer-Cuzick: 0.71% 10 Year Tyrer-Cuzick: 1.86% Lifetime Tyrer-Cuzick: 17.22% TISSUE DENSITY: The breasts are extremely dense, which lowers the sensitivity of mammography. INDICATION: Khushi Cooper is a 38 y.o. female presenting for left breast mastitis. FINDINGS: Patient presents for mastitis with history of left breast 12:00 collection drainage 1 week prior at an outside institution.  There is concern for recurrence of the collection. There are 2 well-circumscribed masses in the upper central and inner central left breast.  There are no suspicious masses, microcalcification, skin thickening or architectural distortions in the right breast.  There are scattered calcifications present bilaterally. Targeted sonographic evaluation of the 12 o'clock position retroareolar left breast demonstrates a 4.5 x 4.2 x 2.9 cm near anechoic collection with floating internal debris and peripheral vascularity.  No evident internal vascularity. Targeted sonographic evaluation of the left breast 3 o'clock position 8 cm from the nipple reveals a 5.1 x 2.8 x 4.3 cm hypoechoic collection with internal debris and peripheral vascularity.  No evident internal vascularity.     Impression:  2 well-circumscribed collections at the left breast 12:00 and left breast 3 o'clock position with low-level internal echoes and peripheral vascularity suspicious for abscesses.  Close clinical follow-up is recommended to ensure resolution of this  "finding.  6-month follow-up ultrasound is recommended to ensure resolution.  The patient is set up for an appointment with breast surgery later this morning. 2.  No evidence of malignancy. ASSESSMENT/BI-RADS CATEGORY: Left: 3 - Probably Benign Right: 2 - Benign Overall: 3 - Probably Benign RECOMMENDATION:      - Ultrasound in 6 months for the left breast.      - Clinical management for the left breast.      - Routine screening mammogram at age 40 for the right breast. Workstation ID: [id number]    US BREAST FOR ABCESS ONLY    Result Date: 3/3/2024  Narrative: HISTORY: . L breast 12 o clock position: area of incraesed redness, hard skin, TTP, flaking of skin. today is day 3 of 10 of being on abx for lactational mastitis MRN:  [id number]     DATE OF SERVICE:  3/2/2024 10:25 PM                       EXAM DESCRIPTION:  US BREAST FOR ABCESS ONLY   COMPARISON:  No relevant recent priors. Findings: Targeted ultrasound in the right lower quadrant shows complex collection measuring up to 3.0 x 3.0 x 3.8 cm within the left breast region concerning for abscess.    Impression: Impression: Complex collection measuring up to 3.0 x 3.0 x 3.8 cm within the left breast region concerning for abscess. Workstation:[id number]    POCT US SOFT TISSUE    Result Date: 3/2/2024  Narrative: This procedure was performed by the ordering provider. It has not been reviewed by a radiologist and the results are available in the provider note.    I personally reviewed and interpreted the above laboratory and imaging data.      Incision and drain    Date/Time: 3/14/2024 9:45 AM    Performed by: SYBIL Fulton  Authorized by: SYBIL Fulton  Universal Protocol:  Risks and benefits: risks, benefits and alternatives were discussed  Consent given by: patient  Time out: Immediately prior to procedure a \"time out\" was called to verify the correct patient, procedure, equipment, support staff and site/side marked as required.  Patient " "understanding: patient states understanding of the procedure being performed  Patient consent: the patient's understanding of the procedure matches consent given  Radiology Images displayed and confirmed. If images not available, report reviewed: imaging studies available  Patient identity confirmed: verbally with patient and provided demographic data    Location:     Type:  Abscess    Location:  Trunk    Trunk location:  L breast  Comments:      Left breast fluid collections identified at the 12:00 and 3:00 positions.  The breast was prepped with povidine-iodine, and 5cc of 1% lidocaine with epi was injected at both the 12:00 and 3:00 sites.  An additional 10cc of 1% lidocaine was injected at the 10:00 periareolar position.  Using an #11 scalpel, the lateral breast was incised, and copious amount of purulent and bloody drainage was evacuated.  Wound was irrigated with 20cc sterile saline and 1/2 iodoform packing was placed.  The upper inner periareolar space was then opened and copious amount of thick bloody drainage was evacuated.  1/2\" iodoform packing was placed.  Breast was significantly softer at this point.  A small firm area remains at the 11:00-12:00 position, likely residual hematoma from prior I&D.  This was discussed with the patient, and it was decided not to make any additional incisions.  Patient tolerated the procedure very well and was provided with instructions and supplies for care at home.       Discussion/Summary: This is a 37 y/o female with left breast abscess, presenting today for I&D.  Following evacuation of fluid collections, there does still appear to be a hematoma present.  I have advised patient to use warm compresses and massage on the area, to take Bactrim to completion, and to change the packing daily.  I will plan to see her again next week for re-evaluation. All questions were answered today.         "

## 2024-03-15 ENCOUNTER — TELEPHONE (OUTPATIENT)
Dept: SURGICAL ONCOLOGY | Facility: CLINIC | Age: 39
End: 2024-03-15

## 2024-03-15 NOTE — TELEPHONE ENCOUNTER
Called pt to see how she's doing after yesterday's procedure.  She states it feels better today.  She took Advil and Tylenol around 9:00 and has had no pain since then.  She states her mother helped her change the packing which went well.  She only experienced a small amount of bleeding from the medial site.  Bactrim prescription will be completed on 3/20. Advised patient that when I see her on 3/21 I will reassess to see if she needs to extend her abx.  Pt was thankful for the call.

## 2024-03-21 ENCOUNTER — OFFICE VISIT (OUTPATIENT)
Dept: SURGICAL ONCOLOGY | Facility: CLINIC | Age: 39
End: 2024-03-21
Payer: COMMERCIAL

## 2024-03-21 VITALS
HEIGHT: 67 IN | BODY MASS INDEX: 28.17 KG/M2 | WEIGHT: 179.5 LBS | TEMPERATURE: 97.5 F | RESPIRATION RATE: 18 BRPM | HEART RATE: 89 BPM | OXYGEN SATURATION: 97 % | DIASTOLIC BLOOD PRESSURE: 68 MMHG | SYSTOLIC BLOOD PRESSURE: 102 MMHG

## 2024-03-21 DIAGNOSIS — N61.1 LEFT BREAST ABSCESS: ICD-10-CM

## 2024-03-21 DIAGNOSIS — N61.0 MASTITIS, LEFT, ACUTE: Primary | ICD-10-CM

## 2024-03-21 PROCEDURE — 99214 OFFICE O/P EST MOD 30 MIN: CPT

## 2024-03-21 PROCEDURE — 10060 I&D ABSCESS SIMPLE/SINGLE: CPT

## 2024-03-21 RX ORDER — SULFAMETHOXAZOLE AND TRIMETHOPRIM 800; 160 MG/1; MG/1
1 TABLET ORAL EVERY 12 HOURS SCHEDULED
Qty: 14 TABLET | Refills: 0 | Status: SHIPPED | OUTPATIENT
Start: 2024-03-21 | End: 2024-03-28

## 2024-03-21 RX ORDER — AMOXICILLIN AND CLAVULANATE POTASSIUM 875; 125 MG/1; MG/1
1 TABLET, FILM COATED ORAL EVERY 12 HOURS SCHEDULED
COMMUNITY
Start: 2024-03-03 | End: 2024-03-21

## 2024-03-21 NOTE — PROGRESS NOTES
Surgical Oncology Follow Up       [facility] ONCOLOG[facility] [place]  [address]  [place] [address]    Khushi Cooper  1985  [id number]  [facility] [place]  [address]  [place] [address]    Diagnoses and all orders for this visit:    Mastitis, left, acute    Left breast abscess  -     sulfamethoxazole-trimethoprim (BACTRIM DS) 800-160 mg per tablet; Take 1 tablet by mouth every 12 (twelve) hours for 7 days    Other orders  -     Discontinue: amoxicillin-clavulanate (AUGMENTIN) 875-125 mg per tablet; Take 1 tablet by mouth every 12 (twelve) hours (Patient not taking: Reported on 3/21/2024)        Chief Complaint   Patient presents with    Follow-up       Return in about 1 week (around 3/28/2024) for Office Visit with Dr Carranza.      History of Present Illness: The patient returns to the office today for re-evaluation of a left breast multifocal abscess.  She completed a 7-day course of Bactrim yesterday.  She states the breast is hard and the drained areas feel like they filled up again.  She reports pus draining from the lateral I&D site.  Denies any fever or chills.        Review of Systems   Constitutional:  Negative for activity change, appetite change, chills and fever.   HENT: Negative.     Respiratory: Negative.     Cardiovascular: Negative.    Gastrointestinal: Negative.  Negative for abdominal pain, nausea and vomiting.   Musculoskeletal: Negative.    Skin:  Positive for color change and wound.   Neurological: Negative.  Negative for dizziness and headaches.   Hematological: Negative.  Negative for adenopathy.   Psychiatric/Behavioral: Negative.               Patient Active Problem List   Diagnosis    Anxiety    Fatigue    Tension type headache    39 weeks gestation of pregnancy    Multigravida of advanced maternal age in third trimester    Previous  delivery  affecting pregnancy    Back pain affecting pregnancy in third trimester    Gastroesophageal reflux disease    S/P repeat low transverse     Lactation suppression    Mastitis, left, acute     Past Medical History:   Diagnosis Date    Anxiety     Depression      Past Surgical History:   Procedure Laterality Date     SECTION      [place]  DELIVERY ONLY N/A 2023    Procedure:  SECTION () REPEAT;  Surgeon: Kelli Hannah MD;  Location: [facility];  Service: Obstetrics    WISDOM TOOTH EXTRACTION Bilateral      Family History   Problem Relation Age of Onset    No Known Problems Mother     Hiatal hernia Father     Atrial fibrillation Father     Prostate cancer Father         age dx unknown    No Known Problems Sister     Anxiety disorder Daughter     No Known Problems Maternal Grandmother     No Known Problems Maternal Grandfather     Breast cancer Paternal Grandmother         age dx unknown    Hiatal hernia Paternal Grandmother     No Known Problems Paternal Grandfather     Bipolar disorder Brother     No Known Problems Maternal Aunt     Colon cancer Neg Hx     Ovarian cancer Neg Hx      Social History     Socioeconomic History    Marital status: /Civil Union     Spouse name: Not on file    Number of children: Not on file    Years of education: Not on file    Highest education level: Not on file   Occupational History    Occupation: [employer]   Tobacco Use    Smoking status: Never    Smokeless tobacco: Never   Vaping Use    Vaping status: Never Used   Substance and Sexual Activity    Alcohol use: Yes     Alcohol/week: 1.0 standard drink of alcohol     Types: 1 Glasses of wine per week     Comment: not a frequent drinker    Drug use: No    Sexual activity: Yes     Partners: Male     Birth control/protection: None   Other Topics Concern    Not on file   Social History Narrative    CONSUMES 2 CUPS OF COFFEE PER DY    Birth control not practiced    IUD in place     Social  Determinants of Health     Financial Resource Strain: Not on file   Food Insecurity: Not on file   Transportation Needs: Not on file   Physical Activity: Not on file   Stress: Not on file   Social Connections: Not on file   Intimate Partner Violence: Not on file   Housing Stability: Not on file       Current Outpatient Medications:     cholecalciferol (VITAMIN D3) 1,000 units tablet, Take 2,000 Units by mouth daily, Disp: , Rfl:     escitalopram (LEXAPRO) 10 mg tablet, Take 10 mg by mouth daily, Disp: , Rfl:     Probiotic Product (PROBIOTIC-10 PO), Take by mouth, Disp: , Rfl:     sulfamethoxazole-trimethoprim (BACTRIM DS) 800-160 mg per tablet, Take 1 tablet by mouth every 12 (twelve) hours for 7 days, Disp: 14 tablet, Rfl: 0  No Known Allergies  Vitals:    03/21/24 0837   BP: 102/68   Pulse: 89   Resp: 18   Temp: 97.5 °F (36.4 °C)   SpO2: 97%       Physical Exam  Vitals reviewed.   Constitutional:       General: She is not in acute distress.     Appearance: Normal appearance. She is normal weight. She is not ill-appearing or toxic-appearing.   HENT:      Head: Normocephalic and atraumatic.      Nose: Nose normal.      Mouth/Throat:      Mouth: Mucous membranes are moist.   Eyes:      General: No scleral icterus.     Conjunctiva/sclera: Conjunctivae normal.   Cardiovascular:      Rate and Rhythm: Normal rate.   Pulmonary:      Effort: Pulmonary effort is normal.   Chest:          Comments: Left breast has several large, firm abscess collections with overlying mild erythema and ecchymosis.    Musculoskeletal:         General: Normal range of motion.      Cervical back: Normal range of motion and neck supple.   Skin:     General: Skin is warm and dry.      Findings: Bruising and erythema present.   Neurological:      General: No focal deficit present.      Mental Status: She is alert and oriented to person, place, and time.   Psychiatric:         Mood and Affect: Mood normal.         Behavior: Behavior normal.          "Thought Content: Thought content normal.         Judgment: Judgment normal.       Incision and drain    Date/Time: 3/21/2024 8:30 AM    Performed by: SYBIL Fulton  Authorized by: SYBIL Fulton  Universal Protocol:  Consent: Verbal consent obtained. Written consent obtained.  Risks and benefits: risks, benefits and alternatives were discussed  Consent given by: patient  Time out: Immediately prior to procedure a \"time out\" was called to verify the correct patient, procedure, equipment, support staff and site/side marked as required.  Patient understanding: patient states understanding of the procedure being performed  Patient consent: the patient's understanding of the procedure matches consent given  Patient identity confirmed: verbally with patient and provided demographic data    Patient location:  Clinic  Location:     Type:  Abscess    Location:  Trunk    Trunk location:  L breast  Comments:      Prior to procedure, bedside US was performed by Dr Carranza, and two distinct abscess pockets were identified. The medial collection appears to be greater than 5cm deep; lateral collection is 2-3cm deep. Breast was prepped with povidine-iodine.  3cc of 1% lidocaine with epi was instilled in the lateral and medial I&D sites.  An additional 10cc of plain 1% lidocaine was instilled more deeply at each of the two sites.  Using an 11 blade scalpel, fluid pockets were accessed and drained.  Copious amounts of bloody purulent fluid was expelled from each area; these were then flushed with 20cc sterile saline each.  The patient experienced moderate bleeding.  Both sites were deeply packed with 1/4\" iodoform packing, and firm pressure was applied for 5 minutes to achieve hemostasis.  Pressure dressing were placed.  Patient tolerated the procedure well.         Discussion/Summary: This is a 39 y/o female who presents today for re-evaluation of left breast abscesses.  I have performed repeat I&D of the lateral and " medial sites. I will restart Bactrim for an additional 7 days, and I will have her see Dr Carranza next week.  If today's treatment does not appear to provide adequate resolution, Dr Carranza can discuss surgical intervention.  She is agreeable to the plan, all questions were answered today.

## 2024-03-21 NOTE — LETTER
March 21, 2024     Juan R Carranza MD  [facility]  [address] [facility] [address]    Patient: Khushi Cooper   YOB: 1985   Date of Visit: 3/21/2024       Dear Dr. Carranza:    Thank you for referring Khushi Cooper to me for evaluation. Below are my notes for this consultation.    If you have questions, please do not hesitate to call me. I look forward to following your patient along with you.         Sincerely,        SYBIL Fulton        CC: MD Yasemin Azar CRNP  3/21/2024 10:29 AM  Sign when Signing Visit               Surgical Oncology Follow Up       [address] [place] [address] SURGICAL ONCOLOGY [address] [address]    Khushi Cooper  1985  [id number]  [address] [place] [address] SURGICAL ONCOLOGY [address] [address]    Diagnoses and all orders for this visit:    Mastitis, left, acute    Left breast abscess  -     sulfamethoxazole-trimethoprim (BACTRIM DS) 800-160 mg per tablet; Take 1 tablet by mouth every 12 (twelve) hours for 7 days    Other orders  -     Discontinue: amoxicillin-clavulanate (AUGMENTIN) 875-125 mg per tablet; Take 1 tablet by mouth every 12 (twelve) hours (Patient not taking: Reported on 3/21/2024)        Chief Complaint   Patient presents with   • Follow-up       Return in about 1 week (around 3/28/2024) for Office Visit with Dr Carranza.      History of Present Illness: The patient returns to the office today for re-evaluation of a left breast multifocal abscess.  She completed a 7-day course of Bactrim yesterday.  She states the breast is hard and the drained areas feel like they filled up again.  She reports pus draining from the lateral I&D site.  Denies any fever or chills.        Review of Systems   Constitutional:  Negative for activity change, appetite change, chills and fever.   HENT: Negative.     Respiratory: Negative.      Cardiovascular: Negative.    Gastrointestinal: Negative.  Negative for abdominal pain, nausea and vomiting.   Musculoskeletal: Negative.    Skin:  Positive for color change and wound.   Neurological: Negative.  Negative for dizziness and headaches.   Hematological: Negative.  Negative for adenopathy.   Psychiatric/Behavioral: Negative.               Patient Active Problem List   Diagnosis   • Anxiety   • Fatigue   • Tension type headache   • 39 weeks gestation of pregnancy   • Multigravida of advanced maternal age in third trimester   • Previous  delivery affecting pregnancy   • Back pain affecting pregnancy in third trimester   • Gastroesophageal reflux disease   • S/P repeat low transverse    • Lactation suppression   • Mastitis, left, acute     Past Medical History:   Diagnosis Date   • Anxiety    • Depression      Past Surgical History:   Procedure Laterality Date   •  SECTION     • [place]  DELIVERY ONLY N/A 2023    Procedure:  SECTION () REPEAT;  Surgeon: Kelli Hannah MD;  Location: [facility];  Service: Obstetrics   • WISDOM TOOTH EXTRACTION Bilateral      Family History   Problem Relation Age of Onset   • No Known Problems Mother    • Hiatal hernia Father    • Atrial fibrillation Father    • Prostate cancer Father         age dx unknown   • No Known Problems Sister    • Anxiety disorder Daughter    • No Known Problems Maternal Grandmother    • No Known Problems Maternal Grandfather    • Breast cancer Paternal Grandmother         age dx unknown   • Hiatal hernia Paternal Grandmother    • No Known Problems Paternal Grandfather    • Bipolar disorder Brother    • No Known Problems Maternal Aunt    • Colon cancer Neg Hx    • Ovarian cancer Neg Hx      Social History     Socioeconomic History   • Marital status: /Civil Union     Spouse name: Not on file   • Number of children: Not on file   • Years of education: Not on file   • Highest education level:  Not on file   Occupational History   • Occupation: [employer]   Tobacco Use   • Smoking status: Never   • Smokeless tobacco: Never   Vaping Use   • Vaping status: Never Used   Substance and Sexual Activity   • Alcohol use: Yes     Alcohol/week: 1.0 standard drink of alcohol     Types: 1 Glasses of wine per week     Comment: not a frequent drinker   • Drug use: No   • Sexual activity: Yes     Partners: Male     Birth control/protection: None   Other Topics Concern   • Not on file   Social History Narrative    CONSUMES 2 CUPS OF COFFEE PER DY    Birth control not practiced    IUD in place     Social Determinants of Health     Financial Resource Strain: Not on file   Food Insecurity: Not on file   Transportation Needs: Not on file   Physical Activity: Not on file   Stress: Not on file   Social Connections: Not on file   Intimate Partner Violence: Not on file   Housing Stability: Not on file       Current Outpatient Medications:   •  cholecalciferol (VITAMIN D3) 1,000 units tablet, Take 2,000 Units by mouth daily, Disp: , Rfl:   •  escitalopram (LEXAPRO) 10 mg tablet, Take 10 mg by mouth daily, Disp: , Rfl:   •  Probiotic Product (PROBIOTIC-10 PO), Take by mouth, Disp: , Rfl:   •  sulfamethoxazole-trimethoprim (BACTRIM DS) 800-160 mg per tablet, Take 1 tablet by mouth every 12 (twelve) hours for 7 days, Disp: 14 tablet, Rfl: 0  No Known Allergies  Vitals:    03/21/24 0837   BP: 102/68   Pulse: 89   Resp: 18   Temp: 97.5 °F (36.4 °C)   SpO2: 97%       Physical Exam  Vitals reviewed.   Constitutional:       General: She is not in acute distress.     Appearance: Normal appearance. She is normal weight. She is not ill-appearing or toxic-appearing.   HENT:      Head: Normocephalic and atraumatic.      Nose: Nose normal.      Mouth/Throat:      Mouth: Mucous membranes are moist.   Eyes:      General: No scleral icterus.     Conjunctiva/sclera: Conjunctivae normal.   Cardiovascular:      Rate and Rhythm: Normal rate.   Pulmonary:  "     Effort: Pulmonary effort is normal.   Chest:          Comments: Left breast has several large, firm abscess collections with overlying mild erythema and ecchymosis.    Musculoskeletal:         General: Normal range of motion.      Cervical back: Normal range of motion and neck supple.   Skin:     General: Skin is warm and dry.      Findings: Bruising and erythema present.   Neurological:      General: No focal deficit present.      Mental Status: She is alert and oriented to person, place, and time.   Psychiatric:         Mood and Affect: Mood normal.         Behavior: Behavior normal.         Thought Content: Thought content normal.         Judgment: Judgment normal.       Incision and drain    Date/Time: 3/21/2024 8:30 AM    Performed by: SYBIL Fulton  Authorized by: SYBIL Fulton  Universal Protocol:  Consent: Verbal consent obtained. Written consent obtained.  Risks and benefits: risks, benefits and alternatives were discussed  Consent given by: patient  Time out: Immediately prior to procedure a \"time out\" was called to verify the correct patient, procedure, equipment, support staff and site/side marked as required.  Patient understanding: patient states understanding of the procedure being performed  Patient consent: the patient's understanding of the procedure matches consent given  Patient identity confirmed: verbally with patient and provided demographic data    Patient location:  Clinic  Location:     Type:  Abscess    Location:  Trunk    Trunk location:  L breast  Comments:      Prior to procedure, bedside US was performed by Dr Carranza, and two distinct abscess pockets were identified. The medial collection appears to be greater than 5cm deep; lateral collection is 2-3cm deep. Breast was prepped with povidine-iodine.  3cc of 1% lidocaine with epi was instilled in the lateral and medial I&D sites.  An additional 10cc of plain 1% lidocaine was instilled more deeply at each of the two " "sites.  Using an 11 blade scalpel, fluid pockets were accessed and drained.  Copious amounts of bloody purulent fluid was expelled from each area; these were then flushed with 20cc sterile saline each.  The patient experienced moderate bleeding.  Both sites were deeply packed with 1/4\" iodoform packing, and firm pressure was applied for 5 minutes to achieve hemostasis.  Pressure dressing were placed.  Patient tolerated the procedure well.         Discussion/Summary: This is a 37 y/o female who presents today for re-evaluation of left breast abscesses.  I have performed repeat I&D of the lateral and medial sites. I will restart Bactrim for an additional 7 days, and I will have her see Dr Carranza next week.  If today's treatment does not appear to provide adequate resolution, Dr Carranza can discuss surgical intervention.  She is agreeable to the plan, all questions were answered today.        "

## 2024-03-23 ENCOUNTER — TELEPHONE (OUTPATIENT)
Dept: OTHER | Facility: OTHER | Age: 39
End: 2024-03-23

## 2024-03-23 ENCOUNTER — DOCUMENTATION (OUTPATIENT)
Dept: SURGICAL ONCOLOGY | Facility: CLINIC | Age: 39
End: 2024-03-23

## 2024-03-23 NOTE — PROGRESS NOTES
Vera had contacted the answering service and I spoke with her telephonically.  She had changed her packing but there was considerable bleeding and unable to control it well initially.  She was afraid to try and repack it today.  I provided her 3 options of waiting until Monday going to the emergency room or come into the clinic with her  and we could change the packing.  She chose the latter.  I met her in the office.  Her packing was removed small amount of purulence was drained from the lateral aspect old blood was slowly emanating from the medial packing.  The packing's were replaced with approximately 6 inches of quarter inch iodoform gauze.  The patient tolerated the procedure very well.  Post packing directions were provided.  She will contact her office on Monday so that we can change her packing again.  Patient was greatly appreciative of the visit.  All questions were answered to her satisfaction as well as her 's.

## 2024-03-23 NOTE — TELEPHONE ENCOUNTER
Pt called stating on Thursday they made 2 surgical incisions on her breast in the office. She is to change the packing everyday but had uncontrolled bleeding the first time she changed it. She has to change it today but is afraid she will have bleeding again. On call notified via TC.

## 2024-03-25 ENCOUNTER — TELEPHONE (OUTPATIENT)
Dept: SURGICAL ONCOLOGY | Facility: CLINIC | Age: 39
End: 2024-03-25

## 2024-03-25 NOTE — TELEPHONE ENCOUNTER
"Called the patient at the request of Dr. Carranza to offer her a same-day appointment for a packing change. She stated that her packing fell out overnight and she re-packed the wound already this morning. The patient reported that she was able to use a lot of packing and \"packed deep.\" She confirmed that she is comfortable managing the wound packing by herself until her previously scheduled appointment on Wednesday. The patient was appreciative of the call and denied any questions at this time.  "

## 2024-03-27 ENCOUNTER — OFFICE VISIT (OUTPATIENT)
Dept: SURGICAL ONCOLOGY | Facility: CLINIC | Age: 39
End: 2024-03-27
Payer: COMMERCIAL

## 2024-03-27 VITALS
BODY MASS INDEX: 28.41 KG/M2 | HEART RATE: 81 BPM | DIASTOLIC BLOOD PRESSURE: 64 MMHG | WEIGHT: 181 LBS | OXYGEN SATURATION: 98 % | RESPIRATION RATE: 15 BRPM | TEMPERATURE: 97 F | HEIGHT: 67 IN | SYSTOLIC BLOOD PRESSURE: 104 MMHG

## 2024-03-27 DIAGNOSIS — Z51.89 ENCOUNTER FOR WOUND CARE: ICD-10-CM

## 2024-03-27 DIAGNOSIS — N61.1 ABSCESS OF LEFT BREAST: Primary | ICD-10-CM

## 2024-03-27 PROCEDURE — 99212 OFFICE O/P EST SF 10 MIN: CPT | Performed by: SURGERY

## 2024-03-27 NOTE — PROGRESS NOTES
Surgical Oncology Follow Up       [facility]COL[facility] [place]  [address] [facility] [address]    Khushi Cooper  1985  [id number]  [facility] [place]  [address] [facility]  [facility] [address]    Chief Complaint   Patient presents with    Follow-up          Assessment & Plan:   Presents for a follow-up visit.  She has been packing her wound and doing a good job.  She is finishing her antibiotics soon.  There is no evidence of cellulitis.  I changed her packing there was some old blood which was expressed.  I recommended she move the packing in the shower and clean up in case there is any old blood and then repack it on a daily basis.  She will continue this.  I would not recommend additional antibiotics at this time.  Will see her back in 2 weeks or earlier if there are any issues.  I recommend she have a low threshold to contact us.    Cancer History:     Oncology History    No history exists.         Interval History:   See above    Review of Systems:   Review of Systems   All other systems reviewed and are negative.      Past Medical History     Patient Active Problem List   Diagnosis    Anxiety    Fatigue    Tension type headache    39 weeks gestation of pregnancy    Multigravida of advanced maternal age in third trimester    Previous  delivery affecting pregnancy    Back pain affecting pregnancy in third trimester    Gastroesophageal reflux disease    S/P repeat low transverse     Lactation suppression    Mastitis, left, acute     Past Medical History:   Diagnosis Date    Anxiety     Depression      Past Surgical History:   Procedure Laterality Date     SECTION      [place]  DELIVERY ONLY N/A 2023    Procedure:  SECTION () REPEAT;  Surgeon: Kelli Hannah MD;  Location: [facility];  Service: Obstetrics    WISDOM TOOTH EXTRACTION Bilateral       Family History   Problem Relation Age of Onset    No Known Problems Mother     Hiatal hernia Father     Atrial fibrillation Father     Prostate cancer Father         age dx unknown    No Known Problems Sister     Anxiety disorder Daughter     No Known Problems Maternal Grandmother     No Known Problems Maternal Grandfather     Breast cancer Paternal Grandmother         age dx unknown    Hiatal hernia Paternal Grandmother     No Known Problems Paternal Grandfather     Bipolar disorder Brother     No Known Problems Maternal Aunt     Colon cancer Neg Hx     Ovarian cancer Neg Hx      Social History     Socioeconomic History    Marital status: /Civil Union     Spouse name: Not on file    Number of children: Not on file    Years of education: Not on file    Highest education level: Not on file   Occupational History    Occupation: [employer]   Tobacco Use    Smoking status: Never    Smokeless tobacco: Never   Vaping Use    Vaping status: Never Used   Substance and Sexual Activity    Alcohol use: Yes     Alcohol/week: 1.0 standard drink of alcohol     Types: 1 Glasses of wine per week     Comment: not a frequent drinker    Drug use: No    Sexual activity: Yes     Partners: Male     Birth control/protection: None   Other Topics Concern    Not on file   Social History Narrative    CONSUMES 2 CUPS OF COFFEE PER DY    Birth control not practiced    IUD in place     Social Determinants of Health     Financial Resource Strain: Not on file   Food Insecurity: Not on file   Transportation Needs: Not on file   Physical Activity: Not on file   Stress: Not on file   Social Connections: Not on file   Intimate Partner Violence: Not on file   Housing Stability: Not on file       Current Outpatient Medications:     cholecalciferol (VITAMIN D3) 1,000 units tablet, Take 2,000 Units by mouth daily, Disp: , Rfl:     escitalopram (LEXAPRO) 10 mg tablet, Take 10 mg by mouth daily, Disp: , Rfl:     Probiotic Product (PROBIOTIC-10 PO),  Take by mouth, Disp: , Rfl:     sulfamethoxazole-trimethoprim (BACTRIM DS) 800-160 mg per tablet, Take 1 tablet by mouth every 12 (twelve) hours for 7 days, Disp: 14 tablet, Rfl: 0  No Known Allergies    Physical Exam:     Vitals:    03/27/24 1040   BP: 104/64   Pulse: 81   Resp: 15   Temp: (!) 97 °F (36.1 °C)   SpO2: 98%     Physical Exam  Chest:          Comments: Both sides of the left breast the packing was changed.  Patient had packed the wound quite well.   The medial wound has old blood in it.  There is no evidence of cellulitis.          Results & Discussion:   Patient will complete her antibiotics and continue to pack the wound.  Will see her back in 2 weeks as outlined above.          Advance Care Planning/Advance Directives:  I discussed the disease status, treatment plans and follow-up with the patient.

## 2024-04-01 ENCOUNTER — TELEPHONE (OUTPATIENT)
Dept: HEMATOLOGY ONCOLOGY | Facility: CLINIC | Age: 39
End: 2024-04-01

## 2024-04-01 NOTE — TELEPHONE ENCOUNTER
"Returned the patient's phone call to further discuss. The patient reported that everything was going well and she was changing her packing daily as instructed. She stated that she was putting manual \"pressure on the lumps close to the skin\" of her breast to try and push out any residual pus from her I&D incisions. The patient reported that on Saturday night, she \"pushed too hard\" and felt/heard \"crackling\" and then one of her incisions started \"gushing blood.\" She stated that it took approximately 20 minutes for her to control the bleeding and that she has not attempted to re-pack that area since then. Instructed the patient to re-attempt to pack her wound at the current time and to contact the office if she was either unable to or if it started bleeding again. The patient verbalized understanding and was in agreement with this plan. She was appreciative of the call.  "

## 2024-04-01 NOTE — TELEPHONE ENCOUNTER
Patient Call    Who are you speaking with? Patient    If it is not the patient, are they listed on an active communication consent form? N/A   What is the reason for this call? The patient is requesting to speak to the clinical team regarding some questions she has.    Does this require a call back? Yes   If a call back is required, please list [facility] call back number [phone]   If a call back is required, advise that a message will be forwarded to their care team and someone will return their call as soon as possible.   Did you relay this information to the patient? Yes

## 2024-04-12 ENCOUNTER — OFFICE VISIT (OUTPATIENT)
Dept: SURGICAL ONCOLOGY | Facility: CLINIC | Age: 39
End: 2024-04-12
Payer: COMMERCIAL

## 2024-04-12 ENCOUNTER — TELEPHONE (OUTPATIENT)
Dept: HEMATOLOGY ONCOLOGY | Facility: CLINIC | Age: 39
End: 2024-04-12

## 2024-04-12 VITALS
TEMPERATURE: 97.5 F | HEIGHT: 67 IN | HEART RATE: 86 BPM | DIASTOLIC BLOOD PRESSURE: 82 MMHG | RESPIRATION RATE: 18 BRPM | OXYGEN SATURATION: 98 % | SYSTOLIC BLOOD PRESSURE: 110 MMHG | BODY MASS INDEX: 28.25 KG/M2 | WEIGHT: 180 LBS

## 2024-04-12 DIAGNOSIS — N61.1 ABSCESS OF LEFT BREAST: Primary | ICD-10-CM

## 2024-04-12 DIAGNOSIS — N61.0 MASTITIS, LEFT, ACUTE: ICD-10-CM

## 2024-04-12 PROCEDURE — 99213 OFFICE O/P EST LOW 20 MIN: CPT

## 2024-04-12 NOTE — TELEPHONE ENCOUNTER
Patient Running Late       Who are you speaking with? Patient   If it is not the patient, are they listed on an active communication consent form? N/A   When is the appointment scheduled?  Please list date and time 4/12/24 @ 10    Approx 10 min   At which location is the appointment scheduled to take place? [place]   If patient is running less than 15 minutes late, have patient proceed to office. Appointment may need to be rescheduled at providers discretion. If provider cannot see patient today, the office will reschedule the visit.     Was this relayed to patient? Yes

## 2024-04-12 NOTE — PROGRESS NOTES
Surgical Oncology Follow Up       [facility] [place]  [address]  [place] [address]    Khushi Cooper  1985  [id number]  [facility] [place]  [address]  [place] [address]    Diagnoses and all orders for this visit:    Abscess of left breast    Mastitis, left, acute        Chief Complaint   Patient presents with    Follow-up       Return in about 2 weeks (around 2024) for Office Visit.      History of Present Illness: The patient returns to the office today for continued management of lactational mastitic breast abscesses.  She reports she is doing well, and has not experienced any issues.  She is still packing each of the two sites.  Denies any infectious symptoms at this time.      Review of Systems   Constitutional:  Negative for activity change, appetite change, chills and fever.   HENT: Negative.     Respiratory: Negative.     Cardiovascular: Negative.    Gastrointestinal: Negative.    Musculoskeletal: Negative.    Skin:  Positive for wound. Negative for color change.   Neurological: Negative.    Hematological: Negative.  Negative for adenopathy.   Psychiatric/Behavioral: Negative.             Patient Active Problem List   Diagnosis    Anxiety    Fatigue    Tension type headache    39 weeks gestation of pregnancy    Multigravida of advanced maternal age in third trimester    Previous  delivery affecting pregnancy    Back pain affecting pregnancy in third trimester    Gastroesophageal reflux disease    S/P repeat low transverse     Lactation suppression    Mastitis, left, acute     Past Medical History:   Diagnosis Date    Anxiety     Depression      Past Surgical History:   Procedure Laterality Date     SECTION      [place]  DELIV[place] ONLY N/A 2023    Procedure:  SECTION () REPEAT;  Surgeon: Kelli  MD Cam[place];  Location: [facility];  Service: Obstetrics    WISDOM TOOTH EXTRACTION Bilateral      Family History   Problem Relation Age of Onset    No Known Problems Mother     Hiatal hernia Father     Atrial fibrillation Father     Prostate cancer Father         age dx unknown    No Known Problems Sister     Anxiety disorder Daughter     No Known Problems Maternal Grandmother     No Known Problems Maternal Grandfather     Breast cancer Paternal Grandmother         age dx unknown    Hiatal hernia Paternal Grandmother     No Known Problems Paternal Grandfather     Bipolar disorder Brother     No Known Problems Maternal Aunt     Colon cancer Neg Hx     Ovarian cancer Neg Hx      Social History     Socioeconomic History    Marital status: /Civil Union     Spouse name: Not on file    Number of children: Not on file    Years of education: Not on file    Highest education level: Not on file   Occupational History    Occupation: [employer]   Tobacco Use    Smoking status: Never    Smokeless tobacco: Never   Vaping Use    Vaping status: Never Used   Substance and Sexual Activity    Alcohol use: Yes     Alcohol/week: 1.0 standard drink of alcohol     Types: 1 Glasses of wine per week     Comment: not a frequent drinker    Drug use: No    Sexual activity: Yes     Partners: Male     Birth control/protection: None   Other Topics Concern    Not on file   Social History Narrative    CONSUMES 2 CUPS OF COFFEE PER DY    Birth control not practiced    IUD in place     Social Determinants of Health     Financial Resource Strain: Not on file   Food Insecurity: Not on file   Transportation Needs: Not on file   Physical Activity: Not on file   Stress: Not on file   Social Connections: Not on file   Intimate Partner Violence: Not on file   Housing Stability: Not on file       Current Outpatient Medications:     cholecalciferol (VITAMIN D3) 1,000 units tablet, Take 2,000 Units by mouth daily, Disp: , Rfl:     escitalopram (LEXAPRO) 10 mg  tablet, Take 10 mg by mouth daily, Disp: , Rfl:     Probiotic Product (PROBIOTIC-10 PO), Take by mouth, Disp: , Rfl:   No Known Allergies  Vitals:    04/12/24 1019   BP: 110/82   Pulse: 86   Resp: 18   Temp: 97.5 °F (36.4 °C)   SpO2: 98%       Physical Exam  Vitals reviewed.   Constitutional:       General: She is not in acute distress.     Appearance: Normal appearance. She is normal weight. She is not ill-appearing or toxic-appearing.   HENT:      Head: Normocephalic and atraumatic.      Nose: Nose normal.      Mouth/Throat:      Mouth: Mucous membranes are moist.   Eyes:      General: No scleral icterus.     Conjunctiva/sclera: Conjunctivae normal.   Cardiovascular:      Rate and Rhythm: Normal rate.   Pulmonary:      Effort: Pulmonary effort is normal.   Chest:          Comments: Breast is generally soft, nontender, without any erythema or swelling.  Some vague residual firmness in the inner lower quadrant, likely resolving hematoma. No drainage present.  Musculoskeletal:         General: No swelling or tenderness. Normal range of motion.      Cervical back: Normal range of motion and neck supple.   Lymphadenopathy:      Cervical: No cervical adenopathy.   Skin:     General: Skin is warm and dry.      Findings: No erythema.   Neurological:      General: No focal deficit present.      Mental Status: She is alert and oriented to person, place, and time.   Psychiatric:         Mood and Affect: Mood normal.         Behavior: Behavior normal.         Thought Content: Thought content normal.         Judgment: Judgment normal.             Imaging  US breast left limited (diagnostic), Mammo diagnostic bilateral w 3d & cad    Result Date: 3/14/2024  Narrative: DIAGNOSIS: Mastitis, left, acute TECHNIQUE: Digital diagnostic mammography was performed. Computer Aided Detection (CAD) analyzed all applicable images. Left breast ultrasound was performed. COMPARISONS: Prior breast imaging dated: 03/02/2024 RELEVANT HISTORY:   Family Breast Cancer History: History of breast cancer in Paternal Grandmother. Family Medical History: Family medical history includes breast cancer in paternal grandmother. Personal History: Hormone history includes birth control. No known relevant surgical history. No known relevant medical history. RISK ASSESSMENT: 5 Year Tyrer-Cuzick: 0.71% 10 Year Tyrer-Cuzick: 1.86% Lifetime Tyrer-Cuzick: 17.22% TISSUE DENSITY: The breasts are extremely dense, which lowers the sensitivity of mammography. INDICATION: Khushi Cooper is a 38 y.o. female presenting for left breast mastitis. FINDINGS: Patient presents for mastitis with history of left breast 12:00 collection drainage 1 week prior at an outside institution.  There is concern for recurrence of the collection. There are 2 well-circumscribed masses in the upper central and inner central left breast.  There are no suspicious masses, microcalcification, skin thickening or architectural distortions in the right breast.  There are scattered calcifications present bilaterally. Targeted sonographic evaluation of the 12 o'clock position retroareolar left breast demonstrates a 4.5 x 4.2 x 2.9 cm near anechoic collection with floating internal debris and peripheral vascularity.  No evident internal vascularity. Targeted sonographic evaluation of the left breast 3 o'clock position 8 cm from the nipple reveals a 5.1 x 2.8 x 4.3 cm hypoechoic collection with internal debris and peripheral vascularity.  No evident internal vascularity.     Impression:  2 well-circumscribed collections at the left breast 12:00 and left breast 3 o'clock position with low-level internal echoes and peripheral vascularity suspicious for abscesses.  Close clinical follow-up is recommended to ensure resolution of this finding.  6-month follow-up ultrasound is recommended to ensure resolution.  The patient is set up for an appointment with breast surgery later this morning. 2.  No evidence of  "malignancy. ASSESSMENT/BI-RADS CATEGORY: Left: 3 - Probably Benign Right: 2 - Benign Overall: 3 - Probably Benign RECOMMENDATION:      - Ultrasound in 6 months for the left breast.      - Clinical management for the left breast.      - Routine screening mammogram at age 40 for the right breast. Workstation ID: [id number]    I personally reviewed and interpreted the above laboratory and imaging data.    Discussion/Summary: The patient returns to the office today for continued breast abscess management.  She is doing very well, and there is no evidence of residual infection.  I flushed the lateral site with sterile saline, and repacked both sites with 1/4\" iodoform gauze.  Pt has been instructed to continue packing until the holes close.  I will plan to see her again in 2 weeks for one final check.  Should she develop symptoms in the meantime, she should call the office.  She is agreeable to the plan, all questions were answered today.        "

## 2024-04-25 ENCOUNTER — OFFICE VISIT (OUTPATIENT)
Dept: SURGICAL ONCOLOGY | Facility: CLINIC | Age: 39
End: 2024-04-25
Payer: COMMERCIAL

## 2024-04-25 VITALS
TEMPERATURE: 97.3 F | HEIGHT: 67 IN | BODY MASS INDEX: 28.25 KG/M2 | HEART RATE: 77 BPM | DIASTOLIC BLOOD PRESSURE: 78 MMHG | WEIGHT: 180 LBS | RESPIRATION RATE: 18 BRPM | SYSTOLIC BLOOD PRESSURE: 120 MMHG | OXYGEN SATURATION: 100 %

## 2024-04-25 DIAGNOSIS — N61.1 LEFT BREAST ABSCESS: Primary | ICD-10-CM

## 2024-04-25 PROCEDURE — 99213 OFFICE O/P EST LOW 20 MIN: CPT

## 2024-04-25 NOTE — PROGRESS NOTES
Surgical Oncology Follow Up       [facility] [place]  [address]  [place] [address]    Khushi Cooper  1985  [id number]  [facility] [place]  [address]  [place] [address]    Diagnoses and all orders for this visit:    Left breast abscess  -     US breast left limited (diagnostic); Future        Chief Complaint   Patient presents with    Follow-up       Return if symptoms worsen or fail to improve.        History of Present Illness: The patient returns to the office today for continued left breast abscess management.  She states the breast seems to be healing well, and the lateral opening has completely closed.  She is continuing to pack the medial opening.  She denies any drainage, bleeding, swelling, redness or pain.  Denies any fever or chills.      Review of Systems  Complete ROS Surg Onc:   Complete ROS Surg Onc:   Constitutional: The patient denies new or recent history of general fatigue, no recent weight loss, no change in appetite.   Eyes: No complaints of visual problems, no scleral icterus.   ENT: no complaints of ear pain, no hoarseness, no difficulty swallowing,  no tinnitus and no new masses in head, oral cavity, or neck.   Cardiovascular: No complaints of chest pain, no palpitations, no ankle edema.   Respiratory: No complaints of shortness of breath, no cough.   Gastrointestinal: No complaints of jaundice, no bloody stools, no pale stools.   Genitourinary: No complaints of dysuria, no hematuria, no nocturia, no frequent urination, no urethral discharge.   Musculoskeletal: No complaints of weakness, paralysis, joint stiffness or arthralgias.  Integumentary: No complaints of rash, no new lesions.  + Left breast wound.  Neurological: No complaints of convulsions, no seizures, no dizziness.   Hematologic/Lymphatic: No complaints of easy bruising.    Endocrine:  No hot or cold intolerance.  No polydipsia, polyphagia, or polyuria.  Allergy/immunology:  No environmental allergies.  No food allergies.  Not immunocompromised.          Patient Active Problem List   Diagnosis    Anxiety    Fatigue    Tension type headache    39 weeks gestation of pregnancy    Multigravida of advanced maternal age in third trimester    Previous  delivery affecting pregnancy    Back pain affecting pregnancy in third trimester    Gastroesophageal reflux disease    S/P repeat low transverse     Lactation suppression    Mastitis, left, acute     Past Medical History:   Diagnosis Date    Anxiety     Depression      Past Surgical History:   Procedure Laterality Date     SECTION      [place] 2023    Procedure:  SECTION () REPEAT;  Surgeon: Kelli Hannah MD;  Location: [facility];  Service: Obstetrics    WISDOM TOOTH EXTRACTION Bilateral      Family History   Problem Relation Age of Onset    No Known Problems Mother     Hiatal hernia Father     Atrial fibrillation Father     Prostate cancer Father         age dx unknown    No Known Problems Sister     Anxiety disorder Daughter     No Known Problems Maternal Grandmother     No Known Problems Maternal Grandfather     Breast cancer Paternal Grandmother         age dx unknown    Hiatal hernia Paternal Grandmother     No Known Problems Paternal Grandfather     Bipolar disorder Brother     No Known Problems Maternal Aunt     Colon cancer Neg Hx     Ovarian cancer Neg Hx      Social History     Socioeconomic History    Marital status: /Civil Union     Spouse name: Not on file    Number of children: Not on file    Years of education: Not on file    Highest education level: Not on file   Occupational History    Occupation: [employer]   Tobacco Use    Smoking status: Never    Smokeless tobacco: Never   Vaping Use    Vaping status: Never Used   Substance and Sexual Activity     Alcohol use: Yes     Alcohol/week: 1.0 standard drink of alcohol     Types: 1 Glasses of wine per week     Comment: not a frequent drinker    Drug use: No    Sexual activity: Yes     Partners: Male     Birth control/protection: None   Other Topics Concern    Not on file   Social History Narrative    CONSUMES 2 CUPS OF COFFEE PER DY    Birth control not practiced    IUD in place     Social Determinants of Health     Financial Resource Strain: Not on file   Food Insecurity: Not on file   Transportation Needs: Not on file   Physical Activity: Not on file   Stress: Not on file   Social Connections: Not on file   Intimate Partner Violence: Not on file   Housing Stability: Not on file       Current Outpatient Medications:     cholecalciferol (VITAMIN D3) 1,000 units tablet, Take 2,000 Units by mouth daily, Disp: , Rfl:     escitalopram (LEXAPRO) 10 mg tablet, Take 10 mg by mouth daily, Disp: , Rfl:     Probiotic Product (PROBIOTIC-10 PO), Take by mouth, Disp: , Rfl:   No Known Allergies  Vitals:    04/25/24 1026   BP: 120/78   Pulse: 77   Resp: 18   Temp: (!) 97.3 °F (36.3 °C)   SpO2: 100%       Physical Exam  Vitals reviewed.   Constitutional:       General: She is not in acute distress.     Appearance: Normal appearance. She is normal weight. She is not ill-appearing or toxic-appearing.   HENT:      Head: Normocephalic and atraumatic.      Nose: Nose normal.      Mouth/Throat:      Mouth: Mucous membranes are moist.   Eyes:      General: No scleral icterus.     Conjunctiva/sclera: Conjunctivae normal.   Cardiovascular:      Rate and Rhythm: Normal rate.   Pulmonary:      Effort: Pulmonary effort is normal.   Chest:          Comments: Left breast is healing appropriately.  Small opening present at 9:00 position. 12:00 and 3:00 incisions are closed.  There is no erythema or swelling present.  Decreasing areas of firmness present at abscess sites.  Musculoskeletal:         General: No swelling or tenderness. Normal range  of motion.      Cervical back: Normal range of motion and neck supple.   Skin:     General: Skin is dry.      Findings: No erythema.   Neurological:      General: No focal deficit present.      Mental Status: She is alert and oriented to person, place, and time.   Psychiatric:         Mood and Affect: Mood normal.         Behavior: Behavior normal.         Thought Content: Thought content normal.         Judgment: Judgment normal.           Discussion/Summary: This is a 40 y/o female who presents today for left breast abscess management.  This seems to be almost completely resolved.  She should continue to pack the medial opening for at least 3-4 more days, until the opening is too small to access.  She should continue to keep the breast clean and dry, and I have advised her to call the office immediately if she notices recurrent redness or warmth, firmness, drainage, fever or chills.  We will plan to repeat left breast US in September to ensure resolution. I have explained that she may continue to feel lumps in the breasts for several more months as it continues to heal.  Assuming the US shows adequate improvement, I do not need to see her back in the office.  She is agreeable to the plan, all questions were answered today.

## 2024-12-16 ENCOUNTER — NURSE TRIAGE (OUTPATIENT)
Age: 39
End: 2024-12-16

## 2024-12-16 DIAGNOSIS — B37.31 YEAST VAGINITIS: Primary | ICD-10-CM

## 2024-12-16 RX ORDER — FLUCONAZOLE 150 MG/1
150 TABLET ORAL
Qty: 2 TABLET | Refills: 0 | Status: SHIPPED | OUTPATIENT
Start: 2024-12-16 | End: 2024-12-16

## 2024-12-16 RX ORDER — FLUCONAZOLE 150 MG/1
150 TABLET ORAL
Qty: 2 TABLET | Refills: 0 | Status: SHIPPED | OUTPATIENT
Start: 2024-12-16 | End: 2024-12-20

## 2024-12-16 RX ORDER — FLUCONAZOLE 150 MG/1
150 TABLET ORAL
Qty: 2 TABLET | Refills: 0 | Status: SHIPPED | OUTPATIENT
Start: 2024-12-16 | End: 2024-12-16 | Stop reason: CLARIF

## 2024-12-16 NOTE — TELEPHONE ENCOUNTER
"Khushi called believing she has a yeast infection. Reports symptoms of white/clumpy/creamy vaginal discharge with vaginal itching/burning starting yesterday.  Denies vaginal odor/pelvic pain.    Notes prior yeast infection 7/2022.  Prefers diflucan x 2. Has used in past with resolution of symptoms.  States OTC vaginal insert caused more irritation.     Last visit 2/2023 for PP.  Yearly exam scheduled for 2/19/2024 with DR. Dobbins.  Pharmacy in chart confirmed.     Does not fall in OBGYN Yeast protocol for C[facility] to provide RX.  Forwarded to on call provider to review and advise.      Advised open to air at bedtime, avoid perfume soaps and products, recommend cotton underwear only, no thongs, wear looser fit clothing, change out of wet clothing after exercise and/or bathing suits ASAP. Wash  with unscented mil  cleanser such as dove or ivory ay use A&D/Aquaphor oint externally to alleviate irration.  , watch sugar and carb intake. I      Reason for Disposition   Symptoms of a vaginal yeast infection (i.e., white, thick, cottage-cheese-like, itchy, not bad smelling discharge)    Answer Assessment - Initial Assessment Questions  1. DISCHARGE: \"Describe the discharge.\" (e.g., white, yellow, green, gray, foamy, cottage cheese-like)      White clumpy/creamy discharge  2. ODOR: \"Is there a bad odor?\"      denies  3. ONSET: \"When did the discharge begin?\"      yesterday  4. RASH: \"Is there a rash in the genital area?\" If Yes, ask: \"Describe it.\" (e.g., redness, blisters, sores, bumps)      redness  5. ABDOMEN PAIN: \"Are you having any abdomen pain?\" If Yes, ask: \"What does it feel like? \" (e.g., crampy, dull, intermittent, constant)       denies  6. ABDOMEN PAIN SEVERITY: If present, ask: \"How bad is it?\" (e.g., Scale 1-10; mild, moderate, or severe)      N/a  7. CAUSE: \"What do you think is causing the discharge?\" \"Have you had the same problem before?\" \"What happened then?\"      Yeast infection. Treated with " "diflucan x 2.   8. OTHER SYMPTOMS: \"Do you have any other symptoms?\" (e.g., fever, itching, vaginal bleeding, pain with urination, injury to genital area, vaginal foreign body)      itching  9. PREGNANCY: \"Is there any chance you are pregnant?\" \"When was your last menstrual period?\"      LMP 12/6/2024    Protocols used: Vaginal Discharge-Adult-[place]    "

## 2024-12-16 NOTE — TELEPHONE ENCOUNTER
Regarding: vaginal itching and discharge  ----- Message from Kristina LITTLE sent at 12/16/2024 10:42 AM EST -----  Established [facility] called reporting vaginal itching and abnormal discharge. Reports of Hx of yeast infections. Please advise. Thank you.

## 2024-12-16 NOTE — TELEPHONE ENCOUNTER
Kay called back, Rx is not in pharmacy. Per med log. Was sent at phone in.    Escalated to RN to electronically transmit.

## 2024-12-16 NOTE — TELEPHONE ENCOUNTER
Kay notified diflucan x 2 transmitted to pharmacy.  If no improvement will need to schedule appt. For evaluation.

## 2024-12-16 NOTE — TELEPHONE ENCOUNTER
"Reason for Disposition  • Prescription prescribed recently is not at pharmacy and triager has access to patient's EMR and prescription is recorded in the EMR    Additional Information  • Negative: Pain or burning with passing urine (urination) is main symptom    Answer Assessment - Initial Assessment Questions  1. NAME of MEDICINE: \"What medicine(s) are you calling about?\"      Fluconazole x2   2. QUESTION: \"What is your question?\" (e.g., double dose of medicine, side effect)      Sent as \"phone in\"  3. PRESCRIBER: \"Who prescribed the medicine?\" Reason: if prescribed by specialist, call should be referred to that group.      Dr. Martin   4. SYMPTOMS: \"Do you have any symptoms?\" If Yes, ask: \"What symptoms are you having?\"  \"How bad are the symptoms (e.g., mild, moderate, severe)      N/A   5. PREGNANCY:  \"Is there any chance that you are pregnant?\" \"When was your last menstrual period?\"      12/6/2024    Protocols used: Vaginal Discharge-Adult-[place], Medication Question Call-Adult-[place]    "

## 2025-02-19 ENCOUNTER — ANNUAL EXAM (OUTPATIENT)
Dept: OBGYN CLINIC | Facility: CLINIC | Age: 40
End: 2025-02-19
Payer: COMMERCIAL

## 2025-02-19 VITALS
BODY MASS INDEX: 25.58 KG/M2 | DIASTOLIC BLOOD PRESSURE: 62 MMHG | HEIGHT: 67 IN | WEIGHT: 163 LBS | SYSTOLIC BLOOD PRESSURE: 114 MMHG

## 2025-02-19 DIAGNOSIS — Z01.419 ENCOUNTER FOR GYNECOLOGICAL EXAMINATION: Primary | ICD-10-CM

## 2025-02-19 DIAGNOSIS — Z12.31 ENCOUNTER FOR SCREENING MAMMOGRAM FOR MALIGNANT NEOPLASM OF BREAST: ICD-10-CM

## 2025-02-19 PROCEDURE — S0612 ANNUAL GYNECOLOGICAL EXAMINA: HCPCS | Performed by: OBSTETRICS & GYNECOLOGY

## 2025-02-19 NOTE — PROGRESS NOTES
ASSESSMENT & PLAN: Khushi Cooper is a 39 y.o.  with normal gynecologic exam.    1.  Routine well woman exam done today  2.  Pap and HPV:  The patient's last pap and hpv was .    It was normal.    Pap and cotesting was not done today.    Current ASCCP Guidelines reviewed.   3.  The following were reviewed in today's visit: breast self exam, mammography screening ordered, exercise, and healthy diet.      CC:  Annual Gynecologic Examination    HPI: Khushi Cooper is a 39 y.o.  who presents for annual gynecologic examination.  She has the following concerns:  none    Health Maintenance:    She wears her seatbelt routinely.    She does perform regular monthly self breast exams.    She feels safe at home.     Past Medical History:   Diagnosis Date    Anxiety     Depression        Past Surgical History:   Procedure Laterality Date     SECTION      SC  DELIVERY ONLY N/A 2023    Procedure:  SECTION () REPEAT;  Surgeon: Kelli Hannah MD;  Location: [facility];  Service: Obstetrics    WISDOM TOOTH EXTRACTION Bilateral        Past OB/Gyn History:  OB History          2    Para   2    Term   2            AB        Living   2         SAB        IAB        Ectopic        Multiple   0    Live Births   2           Obstetric Comments   Menarche age 14  First child age 23               Family History   Problem Relation Age of Onset    No Known Problems Mother     Hiatal hernia Father     Atrial fibrillation Father     Prostate cancer Father         age dx unknown    No Known Problems Sister     Anxiety disorder Daughter     No Known Problems Maternal Grandmother     No Known Problems Maternal Grandfather     Breast cancer Paternal Grandmother         age dx unknown    Hiatal hernia Paternal Grandmother     No Known Problems Paternal Grandfather     Bipolar disorder Brother     No Known Problems Maternal Aunt     Colon cancer Neg Hx     Ovarian cancer Neg  Hx        Social History:  Social History     Socioeconomic History    Marital status: /Civil Union     Spouse name: Not on file    Number of children: Not on file    Years of education: Not on file    Highest education level: Not on file   Occupational History    Occupation: [employer]   Tobacco Use    Smoking status: Never    Smokeless tobacco: Never   Vaping Use    Vaping status: Never Used   Substance and Sexual Activity    Alcohol use: Yes     Alcohol/week: 1.0 standard drink of alcohol     Types: 1 Glasses of wine per week     Comment: not a frequent drinker    Drug use: No    Sexual activity: Yes     Partners: Male     Birth control/protection: None   Other Topics Concern    Not on file   Social History Narrative    CONSUMES 2 CUPS OF COFFEE PER DY    Birth control not practiced    IUD in place     Social Drivers of Health     Financial Resource Strain: Not on file   Food Insecurity: Not on file   Transportation Needs: Not on file   Physical Activity: Not on file   Stress: Not on file   Social Connections: Not on file   Intimate Partner Violence: Not on file   Housing Stability: Not on file       No Known Allergies      Current Outpatient Medications:     escitalopram (LEXAPRO) 10 mg tablet, Take 10 mg by mouth daily, Disp: , Rfl:     cholecalciferol (VITAMIN D3) 1,000 units tablet, Take 2,000 Units by mouth daily (Patient not taking: Reported on 2/19/2025), Disp: , Rfl:     Probiotic Product (PROBIOTIC-10 PO), Take by mouth (Patient not taking: Reported on 2/19/2025), Disp: , Rfl:       Review of Systems  Constitutional :no fever, feels well, no tiredness, no recent weight gain or loss  ENT: no ear ache, no loss of hearing, no nosebleeds or nasal discharge, no sore throat or hoarseness.  Cardiovascular: no complaints of slow or fast heart beat, no chest pain, no palpitations, no leg claudication or lower extremity edema.  Respiratory: no complaints of shortness of shortness of breath, no DAWSON  Breasts:no  "complaints of breast pain, breast lump, or nipple discharge  Gastrointestinal: no complaints of abdominal pain, constipation, nausea, vomiting, or diarrhea or bloody stools  Genitourinary : no complaints of dysuria, incontinence, pelvic pain, no dysmenorrhea, vaginal discharge or abnormal vaginal bleeding and as noted in HPI.  Musculoskeletal: no complaints of arthralgia, no myalgia, no joint swelling or stiffness, no limb pain or swelling.  Integumentary: no complaints of skin rash or lesion, itching or dry skin  Neurological: no complaints of headache, no confusion, no numbness or tingling, no dizziness or fainting    Objective      /62   Ht 5' 7\" (1.702 m)   Wt 73.9 kg (163 lb)   LMP 02/05/2025 (Approximate)   BMI 25.53 kg/m²   General:   appears stated age, cooperative, alert normal mood and affect   Lungs: Unlabored     Breasts: normal appearance, no masses or tenderness, Inspection negative, No nipple retraction or dimpling, No nipple discharge or bleeding, No axillary or supraclavicular adenopathy, Normal to palpation without dominant masses   Abdomen: soft, non-tender, without masses or organomegaly   Vulva: normal, normal female genitalia, Bartholin's, Urethra, [place] normal, no lesions, normal female hair distribution, no clitoral enlargement   Vagina: normal vagina, no discharge, exudate, lesion, or erythema   Urethra: normal   Cervix: Normal, no discharge. Nontender.   Uterus: normal size, contour, position, consistency, mobility, non-tender   Adnexa: no mass, fullness, tenderness   Psychiatric orientation to person, place, and time: normal. mood and affect: normal      "

## 2025-04-11 ENCOUNTER — TELEPHONE (OUTPATIENT)
Dept: MAMMOGRAPHY | Facility: CLINIC | Age: 40
End: 2025-04-11

## 2025-05-06 ENCOUNTER — TELEPHONE (OUTPATIENT)
Dept: MAMMOGRAPHY | Facility: CLINIC | Age: 40
End: 2025-05-06

## 2025-07-29 ENCOUNTER — HOSPITAL ENCOUNTER (OUTPATIENT)
Dept: MAMMOGRAPHY | Facility: CLINIC | Age: 40
Discharge: HOME | End: 2025-07-29
Attending: OBSTETRICS & GYNECOLOGY
Payer: COMMERCIAL

## 2025-07-29 VITALS — HEIGHT: 67 IN | BODY MASS INDEX: 25.58 KG/M2 | WEIGHT: 163 LBS

## 2025-07-29 DIAGNOSIS — R92.8 ABNORMAL FINDING ON BREAST IMAGING: ICD-10-CM

## 2025-07-29 PROCEDURE — G0279 TOMOSYNTHESIS, MAMMO: HCPCS

## 2025-07-29 PROCEDURE — 77066 DX MAMMO INCL CAD BI: CPT

## 2025-07-29 PROCEDURE — 76642 ULTRASOUND BREAST LIMITED: CPT

## (undated) DEVICE — 3M™ STERI-STRIP™ REINFORCED ADHESIVE SKIN CLOSURES, R1547, 1/2 IN X 4 IN (12 MM X 100 MM), 6 STRIPS/ENVELOPE: Brand: 3M™ STERI-STRIP™

## (undated) DEVICE — ABG MICROSTICKS SAFETY

## (undated) DEVICE — GLOVE SRG BIOGEL ECLIPSE 7

## (undated) DEVICE — PACK C-SECTION PBDS

## (undated) DEVICE — SUT VICRYL 0 CTX 36 IN J978H

## (undated) DEVICE — SWABSTCK, BENZOIN TINCTURE, 1/PK, STRL: Brand: APLICARE

## (undated) DEVICE — SUT VICRYL 0 CT-1 36 IN J946H

## (undated) DEVICE — CHLORAPREP HI-LITE 26ML ORANGE

## (undated) DEVICE — SUT CHROMIC 0 CT-1 27 IN 812H

## (undated) DEVICE — GLOVE INDICATOR PI UNDERGLOVE SZ 7.5 BLUE

## (undated) DEVICE — SUT MONOCRYL 4-0 PS-2 27 IN Y426H